# Patient Record
Sex: FEMALE | Race: WHITE | NOT HISPANIC OR LATINO | Employment: UNEMPLOYED | URBAN - METROPOLITAN AREA
[De-identification: names, ages, dates, MRNs, and addresses within clinical notes are randomized per-mention and may not be internally consistent; named-entity substitution may affect disease eponyms.]

---

## 2017-01-07 ENCOUNTER — OFFICE VISIT (OUTPATIENT)
Dept: URGENT CARE | Facility: CLINIC | Age: 22
End: 2017-01-07
Payer: COMMERCIAL

## 2017-01-07 VITALS
SYSTOLIC BLOOD PRESSURE: 98 MMHG | HEART RATE: 60 BPM | TEMPERATURE: 98.4 F | OXYGEN SATURATION: 99 % | RESPIRATION RATE: 16 BRPM | WEIGHT: 106 LBS | DIASTOLIC BLOOD PRESSURE: 64 MMHG | BODY MASS INDEX: 16.85 KG/M2

## 2017-01-07 DIAGNOSIS — K59.09 OTHER CONSTIPATION: ICD-10-CM

## 2017-01-07 DIAGNOSIS — J03.90 TONSILLITIS: ICD-10-CM

## 2017-01-07 LAB
INT CON NEG: NEGATIVE
INT CON POS: POSITIVE
S PYO AG THROAT QL: NEGATIVE

## 2017-01-07 PROCEDURE — 99214 OFFICE O/P EST MOD 30 MIN: CPT | Performed by: PHYSICIAN ASSISTANT

## 2017-01-07 PROCEDURE — 87880 STREP A ASSAY W/OPTIC: CPT | Performed by: PHYSICIAN ASSISTANT

## 2017-01-07 RX ORDER — CEFUROXIME AXETIL 500 MG/1
500 TABLET ORAL 2 TIMES DAILY
Qty: 14 TAB | Refills: 0 | Status: SHIPPED | OUTPATIENT
Start: 2017-01-07 | End: 2017-01-14

## 2017-01-07 ASSESSMENT — ENCOUNTER SYMPTOMS
EYES NEGATIVE: 1
CARDIOVASCULAR NEGATIVE: 1
SORE THROAT: 1
COUGH: 0
CONSTIPATION: 1
RESPIRATORY NEGATIVE: 1
TROUBLE SWALLOWING: 1
CONSTITUTIONAL NEGATIVE: 1
SWOLLEN GLANDS: 1

## 2017-01-07 NOTE — PROGRESS NOTES
Subjective:      Doug Arora is a 21 y.o. female who presents with Constipation and Pharyngitis            Pharyngitis   This is a new (st; constip) problem. The current episode started yesterday. The problem has been unchanged. Neither side of throat is experiencing more pain than the other. There has been no fever. The pain is moderate. Associated symptoms include swollen glands and trouble swallowing. Pertinent negatives include no coughing. She has had no exposure to strep or mono. She has tried nothing for the symptoms. The treatment provided no relief.       Review of Systems   Constitutional: Negative.    HENT: Positive for sore throat and trouble swallowing.    Eyes: Negative.    Respiratory: Negative.  Negative for cough.    Cardiovascular: Negative.    Gastrointestinal: Positive for constipation.   Skin: Negative.           Objective:     BP 98/64 mmHg  Pulse 60  Temp(Src) 36.9 °C (98.4 °F)  Resp 16  Wt 48.081 kg (106 lb)  SpO2 99%     Physical Exam   Constitutional: She is oriented to person, place, and time. She appears well-developed and well-nourished. No distress.   HENT:   Head: Normocephalic and atraumatic.   Mouth/Throat: No oropharyngeal exudate (+tons redn).   Eyes: EOM are normal. Pupils are equal, round, and reactive to light.   Neck: Normal range of motion. Neck supple.   Abdominal: Soft. Bowel sounds are normal. She exhibits no distension. There is no tenderness.   Neurological: She is alert and oriented to person, place, and time.   Skin: Skin is warm and dry.   Psychiatric: She has a normal mood and affect. Her behavior is normal. Judgment and thought content normal.   Nursing note and vitals reviewed.    Filed Vitals:    01/07/17 1223   BP: 98/64   Pulse: 60   Temp: 36.9 °C (98.4 °F)   Resp: 16   Weight: 48.081 kg (106 lb)   SpO2: 99%     Active Ambulatory Problems     Diagnosis Date Noted   • No Active Ambulatory Problems     Resolved Ambulatory Problems     Diagnosis Date  Noted   • No Resolved Ambulatory Problems     No Additional Past Medical History     Current Outpatient Prescriptions on File Prior to Visit   Medication Sig Dispense Refill   • triamcinolone acetonide (KENALOG) 0.1 % Cream AAA twice daily for at least 2 weeks.  Restart with reoccurance of symptoms 60 g 2   • Loratadine (CLARITIN PO) Take  by mouth.     • azithromycin (ZITHROMAX) 250 MG TABS z-ruby; U.D. 6 Tab 1   • propranolol (INDERAL) 10 MG TABS Take 1 Tab by mouth 2 times a day as needed (for heart palpitations). 20 Tab 2   • hydrOXYzine (ATARAX) 25 MG TABS Take 1 Tab by mouth at bedtime as needed for Anxiety (or difficulty sleeping). 30 Tab 2     No current facility-administered medications on file prior to visit.     Gargles, Cepacol lozenges, Aleve/Advil as needed for throat pain  History reviewed. No pertinent family history.  Review of patient's allergies indicates no known allergies.         rst ng     Assessment/Plan:     ·  tonsillitis, constip      · ceftin rx; Mag Cit for constip

## 2017-01-07 NOTE — Clinical Note
January 7, 2017         Patient: Doug Arora   YOB: 1995   Date of Visit: 1/7/2017           To Whom it May Concern:    Doug Arora was seen in my clinic on 1/7/2017 for illness; please excuse today.    If you have any questions or concerns, please don't hesitate to call.        Sincerely,           Wild Lou PA-C  Electronically Signed

## 2017-02-13 ENCOUNTER — OFFICE VISIT (OUTPATIENT)
Dept: MEDICAL GROUP | Facility: MEDICAL CENTER | Age: 22
End: 2017-02-13
Payer: COMMERCIAL

## 2017-02-13 ENCOUNTER — HOSPITAL ENCOUNTER (OUTPATIENT)
Facility: MEDICAL CENTER | Age: 22
End: 2017-02-13
Attending: NURSE PRACTITIONER
Payer: COMMERCIAL

## 2017-02-13 VITALS
TEMPERATURE: 100 F | DIASTOLIC BLOOD PRESSURE: 82 MMHG | BODY MASS INDEX: 16.45 KG/M2 | RESPIRATION RATE: 12 BRPM | SYSTOLIC BLOOD PRESSURE: 102 MMHG | HEART RATE: 110 BPM | OXYGEN SATURATION: 99 % | WEIGHT: 104.8 LBS | HEIGHT: 67 IN

## 2017-02-13 DIAGNOSIS — N92.6 MISSED MENSES: ICD-10-CM

## 2017-02-13 DIAGNOSIS — R63.4 WEIGHT LOSS: ICD-10-CM

## 2017-02-13 DIAGNOSIS — J02.9 ACUTE PHARYNGITIS, UNSPECIFIED ETIOLOGY: ICD-10-CM

## 2017-02-13 DIAGNOSIS — Z11.3 SCREEN FOR STD (SEXUALLY TRANSMITTED DISEASE): ICD-10-CM

## 2017-02-13 DIAGNOSIS — Z30.011 ENCOUNTER FOR INITIAL PRESCRIPTION OF CONTRACEPTIVE PILLS: ICD-10-CM

## 2017-02-13 DIAGNOSIS — I73.00 RAYNAUD'S PHENOMENON WITHOUT GANGRENE: ICD-10-CM

## 2017-02-13 LAB
INT CON NEG: NEGATIVE
INT CON NEG: NEGATIVE
INT CON POS: POSITIVE
INT CON POS: POSITIVE
POC URINE PREGNANCY TEST: NORMAL
S PYO AG THROAT QL: NORMAL

## 2017-02-13 PROCEDURE — 87491 CHLMYD TRACH DNA AMP PROBE: CPT

## 2017-02-13 PROCEDURE — 87591 N.GONORRHOEAE DNA AMP PROB: CPT

## 2017-02-13 PROCEDURE — 87880 STREP A ASSAY W/OPTIC: CPT | Performed by: NURSE PRACTITIONER

## 2017-02-13 PROCEDURE — 99214 OFFICE O/P EST MOD 30 MIN: CPT | Performed by: NURSE PRACTITIONER

## 2017-02-13 PROCEDURE — 81025 URINE PREGNANCY TEST: CPT | Performed by: NURSE PRACTITIONER

## 2017-02-13 RX ORDER — NORETHINDRONE ACETATE AND ETHINYL ESTRADIOL .02; 1 MG/1; MG/1
1 TABLET ORAL DAILY
Qty: 21 TAB | Refills: 11 | Status: SHIPPED | OUTPATIENT
Start: 2017-02-13 | End: 2018-03-07 | Stop reason: SDUPTHER

## 2017-02-13 RX ORDER — NIFEDIPINE 30 MG
30 TABLET, EXTENDED RELEASE ORAL DAILY
Qty: 30 TAB | Refills: 5 | Status: SHIPPED | OUTPATIENT
Start: 2017-02-13 | End: 2018-01-31

## 2017-02-13 ASSESSMENT — PATIENT HEALTH QUESTIONNAIRE - PHQ9: CLINICAL INTERPRETATION OF PHQ2 SCORE: 3

## 2017-02-13 NOTE — MR AVS SNAPSHOT
"        Doug Arora   2017 11:20 AM   Office Visit   MRN: [id number]    Department:  [address]   Dept Phone:  [phone]    Description:  Female : 1995   Provider:  EUGENIA Perez           Reason for Visit     Eczema patient states she is having issues with dry red skin mostly on her hands and some on her face    Contraception patient would like to discuss options for contraception     Nasal Congestion patient states she started with a sore throat but now has mostly nasal congestion & fever onset 2 days       Allergies as of 2017     No Known Allergies      You were diagnosed with     Weight loss   [831923]       Raynaud's phenomenon without gangrene   [9218257]       Acute pharyngitis, unspecified etiology   [8238834]       Missed menses   [125446]       Screen for STD (sexually transmitted disease)   [211094]       Encounter for initial prescription of contraceptive pills   [073682]         Vital Signs     Blood Pressure Pulse Temperature Respirations Height Weight    102/82 mmHg 110 37.8 °C (100 °F) 12 1.689 m (5' 6.5\") 47.537 kg (104 lb 12.8 oz)    Body Mass Index Oxygen Saturation Smoking Status             16.66 kg/m2 99% Never Smoker          Basic Information     Date Of Birth Sex Race Ethnicity Preferred Language    1995 Female White Non- English      Health Maintenance        Date Due Completion Dates    IMM HEP B VACCINE (1 of 3 - Primary Series) 1997 ---    IMM HEP A VACCINE (1 of 2 - Standard Series) 1998 ---    IMM HPV VACCINE (1 of 3 - Female 3 Dose Series) 2008 ---    IMM VARICELLA (CHICKENPOX) VACCINE (1 of 2 - 2 Dose Adolescent Series) 2010 ---    IMM MENINGOCOCCAL VACCINE (MCV4) (1 of 1) 2013 ---    IMM INFLUENZA (1) 2016 ---    IMM DTaP/Tdap/Td Vaccine (1 - Tdap) 2016 ---            Results     POCT Rapid Strep A      Component    Rapid Strep Screen    neg    Internal Control Positive   " Positive    Internal Control Negative    Negative                POCT Pregnancy      Component Value Standard Range & Units    POC Urine Pregnancy Test neg Negative    Internal Control Positive Positive     Internal Control Negative Negative                         Current Immunizations     No immunizations on file.      Below and/or attached are the medications your provider expects you to take. Review all of your home medications and newly ordered medications with your provider and/or pharmacist. Follow medication instructions as directed by your provider and/or pharmacist. Please keep your medication list with you and share with your provider. Update the information when medications are discontinued, doses are changed, or new medications (including over-the-counter products) are added; and carry medication information at all times in the event of emergency situations     Allergies:  No Known Allergies          Medications  Valid as of: February 13, 2017 -  1:03 PM    Generic Name Brand Name Tablet Size Instructions for use    Azithromycin (Tab) ZITHROMAX 250 MG z-ruby; U.D.        HydrOXYzine HCl (Tab) ATARAX 25 MG Take 1 Tab by mouth at bedtime as needed for Anxiety (or difficulty sleeping).        Loratadine   Take  by mouth.        NIFEdipine (TABLET SR 24 HR) ADALAT CC 30 MG Take 1 Tab by mouth every day. In the evening        Norethindrone Acet-Ethinyl Est (Tab) MICROGESTIN 1/20 1-20 MG-MCG Take 1 Tab by mouth every day.        Propranolol HCl (Tab) INDERAL 10 MG Take 1 Tab by mouth 2 times a day as needed (for heart palpitations).        Triamcinolone Acetonide (Cream) KENALOG 0.1 % AAA twice daily for at least 2 weeks.  Restart with reoccurance of symptoms        .                 Medicines prescribed today were sent to:     [id number] - [place] - [address]    [address] [place] [address]    Phone: [phone] Fax: [phone]    Open 24 Hours?: No    [facility] - [place] -  [address]    [address] [place] [address]    Phone: [phone] Fax: [phone]    Open 24 Hours?: No      Medication refill instructions:       If your prescription bottle indicates you have medication refills left, it is not necessary to call your provider’s office. Please contact your pharmacy and they will refill your medication.    If your prescription bottle indicates you do not have any refills left, you may request refills at any time through one of the following ways: The online [facility]oma system (except Urgent Care), by calling your provider’s office, or by asking your pharmacy to contact your provider’s office with a refill request. Medication refills are processed only during regular business hours and may not be available until the next business day. Your provider may request additional information or to have a follow-up visit with you prior to refilling your medication.   *Please Note: Medication refills are assigned a new Rx number when refilled electronically. Your pharmacy may indicate that no refills were authorized even though a new prescription for the same medication is available at the pharmacy. Please request the medicine by name with the pharmacy before contacting your provider for a refill.        Your To Do List     Future Labs/Procedures Complete By Expires    CHLAMYDIA/GC PCR URINE OR SWAB  As directed 2/13/2018    HEP C VIRUS ANTIBODY  As directed 2/13/2018    HIV ANTIBODIES  As directed 2/13/2018    T.PALLIDUM AB EIA  As directed 2/13/2018      Referral     A referral request has been sent to our patient care coordination department. Please allow 3-5 business days for us to process this request and contact you either by phone or mail. If you do not hear from us by the 5th business day, please call us at [phone].           [facility] Access Code: Activation code not generated  Current [facility]oma Status: Active

## 2017-02-13 NOTE — PROGRESS NOTES
Chief Complaint   Patient presents with   • Eczema     patient states she is having issues with dry red skin mostly on her hands and some on her face   • Contraception     patient would like to discuss options for contraception    • Nasal Congestion     patient states she started with a sore throat but now has mostly nasal congestion & fever onset 2 days        HPI  Doug is a 21-year-old established female here with several issues:  #1-contraception: She is requesting a prescription for birth control pills. She is sexually active with one partner, her boyfriend, for the past year. Denies any other sexual partners. Her boyfriend has had other sexual partners. Her boyfriend is in the  and currently gone until July. She would like to start a birth control pill now to get stabilized on it. She's never had a blood clot and does not smoke. LMP was 12/25/2016, skips menses about 1-2 x per year since starting her menses.  Started menses around age 14 and menses were irregular for 2 years.   Last sexual activity was end of Jan.   #2-URI symptoms: New-onset sore throat, congestion and not feeling well for the past 3-4 days. Both parents have been sick at home but are starting to feel better on their own. Mucus has been clear. She is not taking anything for her symptoms. Denies shortness of breath, wheezing, nausea, vomiting or diarrhea.  #3-complains of redness, dry skin and pain to her fingers on all hands for the past several years. Her fingers go white when she is really cold and then it's painful in the skin turns flesh colored again. She has tried steroid creams for eczema and cocoa butter for dry skin without improvement. She continuously wears gloves when she can.  #4-weight loss: requesting referral to see a nutritionist in order to gain weight.  Patient reports that she's always had a very fast metabolism and it's difficult for her to maintain her weight. She reports that she has lost about 10 pounds in  "the past year. She reports that it's difficult for her to eat frequently because of work and her work schedule. She denies abdominal pain, nausea, vomiting, diarrhea, fevers, chills.      Past medical, surgical, family, and social history is reviewed and updated in Epic chart by me today.   Medications and allergies reviewed and updated in Epic chart by me today.     ROS:   As documented in history of present illness above    Exam:  Blood pressure 102/82, pulse 110, temperature 37.8 °C (100 °F), resp. rate 12, height 1.689 m (5' 6.5\"), weight 47.537 kg (104 lb 12.8 oz), SpO2 99 %.  Constitutional: Alert, no distress, plus 3 vital signs  Skin:  Warm, dry, no rashes invisible areas  Eye: Equal, round and reactive, conjunctiva clear  ENMT: Lips without lesions, good dentition, oropharynx clear    Neck: Trachea midline, no masses, no thyromegaly  Respiratory: Unlabored respiration, lungs clear to auscultation, no wheezes, no rhonchi  Cardiovascular: Normal rate and rhythm, no murmur, no edema. Erythema to all fingers and they are fairly cold to touch.  Cap refill is <3 seconds to all fingers.   Abdomen: Soft, nontender, no masses   Psych: Alert, pleasant, well-groomed, normal affect    A/P:  1. Weight loss  -She would like to see a nutritionist which I agree with. We did have a good discussion about calorie dense, health food choices.  - REFERRAL TO NUTRITION SERVICES    2. Raynaud's phenomenon without gangrene  -Trial of nifedipine 30 mg every evening at a low dosage to assess tolerance. Discussed side effects such as hypotension. Discussed lifestyle modifications. Follow up 4 weeks.  - NIFEdipine (ADALAT CC) 30 MG CR tablet; Take 1 Tab by mouth every day. In the evening  Dispense: 30 Tab; Refill: 5    3. Acute pharyngitis, unspecified etiology  -Negative. Discussed viral nature and symptomatic treatment. Follow-up by the end of the week if not resolved.  - POCT Rapid Strep A    4. Missed menses  -Negative. Likely " stress related to her boyfriend leaving and her busy work schedule.  - POCT Pregnancy    5. Screen for STD (sexually transmitted disease)  -Recommend yearly STD screening. Recommend Pap smears starting at age 21. Encouraged condom use at all times when sexually active.  - CHLAMYDIA/GC PCR URINE OR SWAB; Future    6. Encounter for initial prescription of contraceptive pills  -Discussed various methods of contraception with her and she like to try a pill initially. Discussed how to take a birth control pill with her including what to do with missing pills.Pt counseled on birth control pills. Risks, benefits and complications from hormone use reviewed. Failure rates discussed. Back up contraception and STD prevention discussed.   - norethindrone-ethinyl estradiol (LOESTRIN 1/20, 21,) 1-20 MG-MCG per tablet; Take 1 Tab by mouth every day.  Dispense: 21 Tab; Refill: 11  - HIV ANTIBODIES; Future  - T.PALLIDUM AB EIA; Future  - HEP C VIRUS ANTIBODY; Future

## 2017-02-14 LAB
C TRACH DNA GENITAL QL NAA+PROBE: NEGATIVE
N GONORRHOEA DNA GENITAL QL NAA+PROBE: NEGATIVE
SPECIMEN SOURCE: NORMAL

## 2017-02-18 ENCOUNTER — OFFICE VISIT (OUTPATIENT)
Dept: URGENT CARE | Facility: CLINIC | Age: 22
End: 2017-02-18
Payer: COMMERCIAL

## 2017-02-18 VITALS
TEMPERATURE: 99.9 F | OXYGEN SATURATION: 96 % | HEART RATE: 96 BPM | SYSTOLIC BLOOD PRESSURE: 108 MMHG | BODY MASS INDEX: 17.17 KG/M2 | RESPIRATION RATE: 16 BRPM | WEIGHT: 108 LBS | DIASTOLIC BLOOD PRESSURE: 64 MMHG

## 2017-02-18 DIAGNOSIS — R60.9 SWELLING: ICD-10-CM

## 2017-02-18 DIAGNOSIS — T50.905A MEDICATION SIDE EFFECT, INITIAL ENCOUNTER: ICD-10-CM

## 2017-02-18 DIAGNOSIS — R23.2 FLUSHING: ICD-10-CM

## 2017-02-18 PROCEDURE — 99213 OFFICE O/P EST LOW 20 MIN: CPT | Performed by: PHYSICIAN ASSISTANT

## 2017-02-18 RX ORDER — METHYLPREDNISOLONE 4 MG/1
TABLET ORAL
Qty: 1 TAB | Refills: 0 | Status: SHIPPED | OUTPATIENT
Start: 2017-02-18 | End: 2018-01-31

## 2017-02-18 RX ORDER — CEFUROXIME AXETIL 500 MG/1
500 TABLET ORAL 2 TIMES DAILY
Qty: 10 TAB | Refills: 0 | Status: SHIPPED | OUTPATIENT
Start: 2017-02-18 | End: 2017-02-23

## 2017-02-18 ASSESSMENT — ENCOUNTER SYMPTOMS
FEVER: 1
SWOLLEN GLANDS: 0
RESPIRATORY NEGATIVE: 1
SORE THROAT: 0
NEUROLOGICAL NEGATIVE: 1
NUMBNESS: 0
MUSCULOSKELETAL NEGATIVE: 1
WEAKNESS: 0

## 2017-02-18 NOTE — PROGRESS NOTES
Subjective:      Doug Arora is a 21 y.o. female who presents with Shortness of Breath            Other  This is a new problem. The current episode started in the past 7 days (3d on nifedipine, having red flushing body, some foot swelling, little sob; also some fever (st/fever earlier this wk, but strep was neg)). The problem occurs constantly. The problem has been unchanged. Associated symptoms include a fever. Pertinent negatives include no numbness, sore throat, swollen glands or weakness. Nothing aggravates the symptoms. She has tried nothing for the symptoms. The treatment provided no relief.       Review of Systems   Constitutional: Positive for fever.   HENT: Negative for sore throat.    Respiratory: Negative.    Musculoskeletal: Negative.    Skin: Negative.    Neurological: Negative.  Negative for weakness and numbness.          Objective:     /64 mmHg  Pulse 96  Temp(Src) 37.7 °C (99.9 °F)  Resp 16  Wt 48.988 kg (108 lb)  SpO2 96%     Physical Exam   Constitutional: She is oriented to person, place, and time. She appears well-developed and well-nourished. No distress.   Abdominal: She exhibits no distension. There is no tenderness.   Musculoskeletal: Normal range of motion. She exhibits edema. She exhibits no tenderness (r foot /ank;le, no acute swell; +redness feet, hands).   Neurological: She is alert and oriented to person, place, and time.   Skin: Skin is warm and dry. No rash noted. There is erythema.   Psychiatric: She has a normal mood and affect. Her behavior is normal. Judgment and thought content normal.   Nursing note and vitals reviewed.              Assessment/Plan:     1. Flushing/erythroderma       2. Swelling       3. Medication side effect, initial encounter     · D/c nifedipine; start medrol; abx ; lidex cr

## 2017-02-18 NOTE — MR AVS SNAPSHOT
"        Doug Arora   2017 2:00 PM   Office Visit   MRN: [id number]    Department:  [facility]   Dept Phone:  [phone]    Description:  Female : 1995   Provider:  Wild Lou PA-C           Reason for Visit     Shortness of Breath x 2 days, shortness of breath.  X today swelling in both ankles \"Currently taking Nifedipine and loestrin, not sure if is related\"      Allergies as of 2017     No Known Allergies      You were diagnosed with     Flushing   [782.62.ICD-9-CM]       Swelling   [235366]       Medication side effect, initial encounter   [561873]         Vital Signs     Blood Pressure Pulse Temperature Respirations Weight Oxygen Saturation    108/64 mmHg 96 37.7 °C (99.9 °F) 16 48.988 kg (108 lb) 96%    Smoking Status                   Never Smoker            Basic Information     Date Of Birth Sex Race Ethnicity Preferred Language    1995 Female White Non- English      Your appointments     Mar 14, 2017 10:30 AM   New Patient with Tricia Villar RD   [place])    [address]  [address]  [facility] [address]   [phone]           It is the patient's responsibility to check with your Insurance for benefit coverage for visit / visits.  24 hours notice is required for all appointment changes or cancellation.  Please arrive 20 min. before your appointment time  Please bring the following with you: 1)Picture Id 2) Insurance card 3) Completed Forms if New Patient  If scheduled for DIABETES VISIT please also brin) Medications 2) Meter 3) Blood glucose logs 4) Any recent labs if you have them  If scheduled for NUTRITION VISIT please also brin) 2-3 days of detailed food intake logs 2) Blood glucose monitor and blood glucose logs (if you have them)              Health Maintenance        Date Due Completion Dates    IMM HEP B VACCINE (1 of 3 - Primary Series) 1997 ---    IMM HEP A VACCINE (1 of 2 - Standard " Series) 12/14/1998 ---    IMM HPV VACCINE (1 of 3 - Female 3 Dose Series) 12/14/2008 ---    IMM VARICELLA (CHICKENPOX) VACCINE (1 of 2 - 2 Dose Adolescent Series) 12/14/2010 ---    IMM MENINGOCOCCAL VACCINE (MCV4) (1 of 1) 12/14/2013 ---    IMM INFLUENZA (1) 9/1/2016 ---    IMM DTaP/Tdap/Td Vaccine (1 - Tdap) 12/14/2016 ---            Current Immunizations     No immunizations on file.      Below and/or attached are the medications your provider expects you to take. Review all of your home medications and newly ordered medications with your provider and/or pharmacist. Follow medication instructions as directed by your provider and/or pharmacist. Please keep your medication list with you and share with your provider. Update the information when medications are discontinued, doses are changed, or new medications (including over-the-counter products) are added; and carry medication information at all times in the event of emergency situations     Allergies:  No Known Allergies          Medications  Valid as of: February 18, 2017 -  2:46 PM    Generic Name Brand Name Tablet Size Instructions for use    Azithromycin (Tab) ZITHROMAX 250 MG z-ruby; U.D.        Cefuroxime Axetil (Tab) CEFTIN 500 MG Take 1 Tab by mouth 2 times a day for 5 days.        Fluocinonide (Cream) LIDEX 0.05 % Apply 1 Application to affected area(s) 2 times a day.        HydrOXYzine HCl (Tab) ATARAX 25 MG Take 1 Tab by mouth at bedtime as needed for Anxiety (or difficulty sleeping).        Loratadine   Take  by mouth.        MethylPREDNISolone (Tablet Therapy Pack) MEDROL DOSEPAK 4 MG U.D.        NIFEdipine (TABLET SR 24 HR) ADALAT CC 30 MG Take 1 Tab by mouth every day. In the evening        Norethindrone Acet-Ethinyl Est (Tab) MICROGESTIN 1/20 1-20 MG-MCG Take 1 Tab by mouth every day.        Propranolol HCl (Tab) INDERAL 10 MG Take 1 Tab by mouth 2 times a day as needed (for heart palpitations).        Triamcinolone Acetonide (Cream) KENALOG 0.1 %  AAA twice daily for at least 2 weeks.  Restart with reoccurance of symptoms        .                 Medicines prescribed today were sent to:     [id number] - [place], [place] - [address]    [address] [place] [address]    Phone: [phone] Fax: [phone]    Open 24 Hours?: No    [facility] - [place] (), [place] - [address]    [address] [place] [address] [place] [address]    Phone: [phone] Fax: [phone]    Open 24 Hours?: No      Medication refill instructions:       If your prescription bottle indicates you have medication refills left, it is not necessary to call your provider’s office. Please contact your pharmacy and they will refill your medication.    If your prescription bottle indicates you do not have any refills left, you may request refills at any time through one of the following ways: The online [facility] system (except Urgent Care), by calling your provider’s office, or by asking your pharmacy to contact your provider’s office with a refill request. Medication refills are processed only during regular business hours and may not be available until the next business day. Your provider may request additional information or to have a follow-up visit with you prior to refilling your medication.   *Please Note: Medication refills are assigned a new Rx number when refilled electronically. Your pharmacy may indicate that no refills were authorized even though a new prescription for the same medication is available at the pharmacy. Please request the medicine by name with the pharmacy before contacting your provider for a refill.           [facility] Access Code: Activation code not generated  Current [facility] Status: Active

## 2017-03-14 ENCOUNTER — NON-PROVIDER VISIT (OUTPATIENT)
Dept: HEALTH INFORMATION MANAGEMENT | Facility: MEDICAL CENTER | Age: 22
End: 2017-03-14
Payer: COMMERCIAL

## 2017-03-14 VITALS — BODY MASS INDEX: 16.86 KG/M2 | HEIGHT: 67 IN | WEIGHT: 107.4 LBS

## 2017-03-14 DIAGNOSIS — R63.4 WEIGHT LOSS: ICD-10-CM

## 2017-03-14 PROCEDURE — 97802 MEDICAL NUTRITION INDIV IN: CPT | Performed by: DIETITIAN, REGISTERED

## 2017-03-14 NOTE — PROGRESS NOTES
"3/14/2017 21 y.o.   Referring Provider: EUGENIA Perez       Time in/out:  10:37-11:33am     Anthropometrics/Objective  Filed Vitals:    03/14/17 1553   Height: 1.689 m (5' 6.5\")   Weight: 48.716 kg (107 lb 6.4 oz)       Body mass index is 17.08 kg/(m^2).    Stated Goal Weight:  120-130lb   Weight hx: Pt reports weight loss starting 4 years ago when she got a really bad virus. Lost 20lb. Gained it back but then recently has lost more weight. She reports weight loss when she is sick with the flu or cold because she is not hungry. And then has a difficult time gaining it back.   Estimated Daily Caloric needs At least 1700  Kcal Based on 35kcal/kg   See comprehensive patient history form for further information     Subjective:    Pt eats a vegetarian diet. Does not take B complex or any vitamins.   Denies N/V/D/C   States she has always had a fast metabolism   Is a college student and admits she does have a lot of stress in her life.   Drinks water, juice and coffee.   Eats 3 meals. 1 snack. Skip meals some times. Doesn't remember or have time to snack.   Works 2:30-7:30pm and only has a 15min break to eat. Work 3 days a week   B- 8:30am fruit   L- 12 noon veggie sandwich or  Bean & cheese burrito   S- 4-5pm chips or crackers  D- tacos, or pasta, or potatoes with meat substitute   S- Coffee to do homework   See Patient History Form- Nutrition under Media for complete diet hx and patient information.    Nutrition Diagnosis (PES Statement)  Underweight & Involuntary weight loss related to inadequate energy intake as evidenced by weight hx, BMI <18.5     Client history:  Condition(s) associated with a diagnosis or treatment (specify) NA     Biochemical data, medical test and procedures  No results found for: HBA1C@  No results found for: POCGLUCOSE  No results found for: CHOLSTRLTOT, LDL, HDL, TRIGLYCERIDE      Nutrition Intervention  Nutrition Prescription  Recommended Daily Kcals  1700kcal (300kcal breakfast; " 500kcal lunch and dinner; 200kcal snacks x2    Meal and Snack  Recommend a general/healthful diet ; high calorie     Comprehensive Nutrition education Instruction or training leading to in-depth nutrition related knowledge about:  Theraputic diet for weight gain. Discussed tips for adding more calories to her current meals. Encouraged adding 1-2 snacks and ideas for that. Suggested meal replacement drinks. Reviewed high calorie foods to incorporate on a daily basis.     Monitoring & Evaluation Plan    Behavioral-Environmental:  Behavior : keep a food journal for the next 2 weeks to track calorie intake; consider myfitnesspal     Food / Nutrient Intake:  Fluid/Beverage intake : Choose calorie containing beverages like juice and higher fat milks. Try a meal replacement or protein drink supplement for meal or snack.   Food intake: Add high calorie foods like peanut butter, avocado, cheese, sour cream, full fat dairy, coconut oil to your foods. Eat 5 times a day : 3 meals and 1-2 snacks. Eating more often is difficult for pt with her schedule.    Discussed adding more calories at breakfast (peanut butter and banana sandwich), having a larger snack at work (keeping things there to make is easier), and using meal replacement drinks like Ensure or Boost to add calories for weight gain.   Micronutrient intake: Star taking a B-complex and multivitamin supplements. Consider starting a probiotic for immune and GI health.     Physical Signs / Symptoms:  Weight change : weight gain of 1-2 lb per week to goal of 130lb.     Assessment Notes:   Doug has lost 3lb in the past few months, and almost 15lb over the past 3 years. She is underweight based on her BMI. And is only 81% of her ideal body weight. She does not eat many high calorie foods. Being vegetarian , pt is eating mostly fruits and veggies which are low calorie. Suggested ways to increase calorie intake. And also pt needs to ensure she eats enough protein. Also  recommended she take a multivitamin and Bcomplex as she may be deficient in micronutrients given her diet hx. Recommend checking for anemia (iron and B12 and folate levels. And/or start empirically supplementing). Pt will track her food and beverage intake in an online food journal for me to review in 2 weeks. She is mostly concerned about maintaining the weight once she gains it back. Which we can address as well.     Follow up 2 week s  Tricia Hope, CRISTIN, LD, CDE  [phone]

## 2017-03-14 NOTE — MR AVS SNAPSHOT
Doug Arora   3/14/2017 10:30 AM   Appointment   MRN: [id number]    Department:  [facility]   Dept Phone:  [phone]    Description:  Female : 1995   Provider:  Tricia Villar RD           Allergies as of 3/14/2017     No Known Allergies      Vital Signs     Smoking Status                   Never Smoker            Basic Information     Date Of Birth Sex Race Ethnicity Preferred Language    1995 Female White Non- English      Your appointments     Mar 28, 2017 10:00 AM   Follow Up Visit with Tricia Villar RD   [facility])    [address]  [address]  [place] [id number] [phone]           It is the patient's responsibility to check with your Insurance for benefit coverage for visit / visits.  24 hours notice is required for all appointment changes or cancellation.  Please arrive 20 min. before your appointment time  Please bring the following with you: 1)Picture Id 2) Insurance card 3) Completed Forms if New Patient  If scheduled for DIABETES VISIT please also brin) Medications 2) Meter 3) Blood glucose logs 4) Any recent labs if you have them  If scheduled for NUTRITION VISIT please also brin) 2-3 days of detailed food intake logs 2) Blood glucose monitor and blood glucose logs (if you have them)              Health Maintenance        Date Due Completion Dates    IMM HEP B VACCINE (1 of 3 - Primary Series) 1997 ---    IMM HEP A VACCINE (1 of 2 - Standard Series) 1998 ---    IMM HPV VACCINE (1 of 3 - Female 3 Dose Series) 2008 ---    IMM VARICELLA (CHICKENPOX) VACCINE (1 of 2 - 2 Dose Adolescent Series) 2010 ---    IMM MENINGOCOCCAL VACCINE (MCV4) (1 of 1) 2013 ---    IMM INFLUENZA (1) 2016 ---    IMM DTaP/Tdap/Td Vaccine (1 - Tdap) 2016 ---            Current Immunizations     No immunizations on file.      Below and/or attached are the medications your provider expects you to  take. Review all of your home medications and newly ordered medications with your provider and/or pharmacist. Follow medication instructions as directed by your provider and/or pharmacist. Please keep your medication list with you and share with your provider. Update the information when medications are discontinued, doses are changed, or new medications (including over-the-counter products) are added; and carry medication information at all times in the event of emergency situations     Allergies:  No Known Allergies          Medications  Valid as of: March 14, 2017 - 11:36 AM    Generic Name Brand Name Tablet Size Instructions for use    Azithromycin (Tab) ZITHROMAX 250 MG z-ruby; U.D.        Fluocinonide (Cream) LIDEX 0.05 % Apply 1 Application to affected area(s) 2 times a day.        HydrOXYzine HCl (Tab) ATARAX 25 MG Take 1 Tab by mouth at bedtime as needed for Anxiety (or difficulty sleeping).        Loratadine   Take  by mouth.        MethylPREDNISolone (Tablet Therapy Pack) MEDROL DOSEPAK 4 MG U.D.        NIFEdipine (TABLET SR 24 HR) ADALAT CC 30 MG Take 1 Tab by mouth every day. In the evening        Norethindrone Acet-Ethinyl Est (Tab) MICROGESTIN 1/20 1-20 MG-MCG Take 1 Tab by mouth every day.        Propranolol HCl (Tab) INDERAL 10 MG Take 1 Tab by mouth 2 times a day as needed (for heart palpitations).        Triamcinolone Acetonide (Cream) KENALOG 0.1 % AAA twice daily for at least 2 weeks.  Restart with reoccurance of symptoms        .                 Medicines prescribed today were sent to:     [facility] - [place], [place] - [address]    [address] [facility] [address]    Phone: [phone] Fax: [phone]    Open 24 Hours?: No    [facility] - [place] (), [place] - [address]    [address] [place] [address] () [place] [address]    Phone: [phone] Fax: [phone]    Open 24 Hours?: No      Medication refill instructions:       If your prescription bottle indicates you have medication  refills left, it is not necessary to call your provider’s office. Please contact your pharmacy and they will refill your medication.    If your prescription bottle indicates you do not have any refills left, you may request refills at any time through one of the following ways: The online [employer] system (except Urgent Care), by calling your provider’s office, or by asking your pharmacy to contact your provider’s office with a refill request. Medication refills are processed only during regular business hours and may not be available until the next business day. Your provider may request additional information or to have a follow-up visit with you prior to refilling your medication.   *Please Note: Medication refills are assigned a new Rx number when refilled electronically. Your pharmacy may indicate that no refills were authorized even though a new prescription for the same medication is available at the pharmacy. Please request the medicine by name with the pharmacy before contacting your provider for a refill.           [employer] Access Code: Activation code not generated  Current [employer] Status: Active

## 2017-03-28 ENCOUNTER — NON-PROVIDER VISIT (OUTPATIENT)
Dept: HEALTH INFORMATION MANAGEMENT | Facility: MEDICAL CENTER | Age: 22
End: 2017-03-28
Payer: COMMERCIAL

## 2017-03-28 VITALS — WEIGHT: 110.2 LBS | BODY MASS INDEX: 17.3 KG/M2 | HEIGHT: 67 IN

## 2017-03-28 DIAGNOSIS — R63.4 WEIGHT LOSS: ICD-10-CM

## 2017-03-28 PROCEDURE — 97803 MED NUTRITION INDIV SUBSEQ: CPT | Performed by: DIETITIAN, REGISTERED

## 2017-03-28 NOTE — MR AVS SNAPSHOT
Doug Arora   3/28/2017 10:00 AM   Appointment   MRN: [id number]    Department:  Health [facility]   Dept Phone:  [phone]    Description:  Female : 1995   Provider:  Tricia Villar RD           Allergies as of 3/28/2017     No Known Allergies      Vital Signs     Smoking Status                   Never Smoker            Basic Information     Date Of Birth Sex Race Ethnicity Preferred Language    1995 Female White Non- English      Your appointments     2017  9:00 AM   Follow Up Visit with Tricia Villar RD   [facility])    [address]  [address]  [place] [id number] [phone]           It is the patient's responsibility to check with your Insurance for benefit coverage for visit / visits.  24 hours notice is required for all appointment changes or cancellation.  Please arrive 20 min. before your appointment time  Please bring the following with you: 1)Picture Id 2) Insurance card 3) Completed Forms if New Patient  If scheduled for DIABETES VISIT please also brin) Medications 2) Meter 3) Blood glucose logs 4) Any recent labs if you have them  If scheduled for NUTRITION VISIT please also brin) 2-3 days of detailed food intake logs 2) Blood glucose monitor and blood glucose logs (if you have them)              Health Maintenance        Date Due Completion Dates    IMM HEP B VACCINE (1 of 3 - Primary Series) 1997 ---    IMM HEP A VACCINE (1 of 2 - Standard Series) 1998 ---    IMM HPV VACCINE (1 of 3 - Female 3 Dose Series) 2008 ---    IMM VARICELLA (CHICKENPOX) VACCINE (1 of 2 - 2 Dose Adolescent Series) 2010 ---    IMM MENINGOCOCCAL VACCINE (MCV4) (1 of 1) 2013 ---    IMM INFLUENZA (1) 2016 ---    IMM DTaP/Tdap/Td Vaccine (1 - Tdap) 2016 ---            Current Immunizations     No immunizations on file.      Below and/or attached are the medications your provider expects you to  take. Review all of your home medications and newly ordered medications with your provider and/or pharmacist. Follow medication instructions as directed by your provider and/or pharmacist. Please keep your medication list with you and share with your provider. Update the information when medications are discontinued, doses are changed, or new medications (including over-the-counter products) are added; and carry medication information at all times in the event of emergency situations     Allergies:  No Known Allergies          Medications  Valid as of: March 28, 2017 - 10:53 AM    Generic Name Brand Name Tablet Size Instructions for use    Azithromycin (Tab) ZITHROMAX 250 MG z-ruby; U.D.        Fluocinonide (Cream) LIDEX 0.05 % Apply 1 Application to affected area(s) 2 times a day.        HydrOXYzine HCl (Tab) ATARAX 25 MG Take 1 Tab by mouth at bedtime as needed for Anxiety (or difficulty sleeping).        Loratadine   Take  by mouth.        MethylPREDNISolone (Tablet Therapy Pack) MEDROL DOSEPAK 4 MG U.D.        NIFEdipine (TABLET SR 24 HR) ADALAT CC 30 MG Take 1 Tab by mouth every day. In the evening        Norethindrone Acet-Ethinyl Est (Tab) MICROGESTIN 1/20 1-20 MG-MCG Take 1 Tab by mouth every day.        Propranolol HCl (Tab) INDERAL 10 MG Take 1 Tab by mouth 2 times a day as needed (for heart palpitations).        Triamcinolone Acetonide (Cream) KENALOG 0.1 % AAA twice daily for at least 2 weeks.  Restart with reoccurance of symptoms        .                 Medicines prescribed today were sent to:     [facility] - [place], [place] - [address]    [address] [facility] [address]    Phone: [phone] Fax: [phone]    Open 24 Hours?: No    [facility] - [place] (), [place] - [address]    [address] [place] [address] () [place] [address]    Phone: [phone] Fax: [phone]    Open 24 Hours?: No      Medication refill instructions:       If your prescription bottle indicates you have medication  refills left, it is not necessary to call your provider’s office. Please contact your pharmacy and they will refill your medication.    If your prescription bottle indicates you do not have any refills left, you may request refills at any time through one of the following ways: The online [employer] system (except Urgent Care), by calling your provider’s office, or by asking your pharmacy to contact your provider’s office with a refill request. Medication refills are processed only during regular business hours and may not be available until the next business day. Your provider may request additional information or to have a follow-up visit with you prior to refilling your medication.   *Please Note: Medication refills are assigned a new Rx number when refilled electronically. Your pharmacy may indicate that no refills were authorized even though a new prescription for the same medication is available at the pharmacy. Please request the medicine by name with the pharmacy before contacting your provider for a refill.           [employer] Access Code: Activation code not generated  Current [employer] Status: Active

## 2017-04-25 ENCOUNTER — NON-PROVIDER VISIT (OUTPATIENT)
Dept: HEALTH INFORMATION MANAGEMENT | Facility: MEDICAL CENTER | Age: 22
End: 2017-04-25
Payer: COMMERCIAL

## 2017-04-25 VITALS — WEIGHT: 112.2 LBS | HEIGHT: 67 IN | BODY MASS INDEX: 17.61 KG/M2

## 2017-04-25 DIAGNOSIS — R63.4 WEIGHT LOSS: ICD-10-CM

## 2017-04-25 PROCEDURE — 97803 MED NUTRITION INDIV SUBSEQ: CPT | Performed by: DIETITIAN, REGISTERED

## 2017-04-25 NOTE — PROGRESS NOTES
"Nutrition Reassess    4/25/2017    EUGENIA Perez   21 y.o.   Time: in/out 08:50-09:05am       Subjective:  Pt states she is pleased with her weight gain. She is feeling that she has more energy as well. Continues to use my fitness pal food journal to track calories and protein. Has increased her protein intake; is getting about 15% of kcal on average. She is consuming between 1800-2000kcal on average. She went on a trip last week and stated it was difficult to eat as frequently. Doesn't have any more trips coming up so she is not worried about it.   She takes her multivitamin 3 out of 7 days a week   Is consuming protein at every meal and snack -- beans, nuts/nutbutter, protein bars, cheese, etc . Tried a protein drink but didn't like the taste.     Anthropometrics/Objective    Filed Vitals:    04/25/17 0902   Height: 1.689 m (5' 6.5\")   Weight: 50.894 kg (112 lb 3.2 oz)     Body mass index is 17.84 kg/(m^2).       Weight hx:   107.4lb 3/14/17   110.2lb 3/28/17   112.2lb today 4/25/17   Weight change: +4.8lb in 6 weeks (+0.8lb per week average)     Diet Recall  Time   :B  PB banana sandwich    :S  None    :L  Large port of subs veggie sandwich with avocado and cheese. Or been/cheese burrito.   :S  Larabar    :D  Tacos or pasta . Includes protein or meat substitute    :S  Granola bar or yogurt or PB     ReAssesment/Notes:  Pt has gained an average of 0.8lb per week. Goal is 1-2lb per week ideally, however this is still an appropriate rate of weight gain. She is feeling better with more energy which is great to hear. She is meeting her protein and calorie needs now. I anticipate she will continue to gain weight if she continues as she is doing now. Pt is now eating breakfast which she was skipping before. And by adding 1-2 snacks she has been able to increase her calorie intake. Tracking has helped her to be more aware, and is going to be a good tool to help her long term. Pt has the tools to know what she " needs to do to continue to eat healthy and gain weight. She states she is very happy and that this has been very helpful for her.   She has my contact information if she needs to reach me in the future.     Follow-up: PRN

## 2017-04-25 NOTE — MR AVS SNAPSHOT
"        Doug Arora   2017 9:00 AM   Non-Provider Visit   MRN: [id number]    Department:  [facility]   Dept Phone:  [phone]    Description:  Female : 1995   Provider:  Tricia Villar RD           Reason for Visit     Weight Loss           Allergies as of 2017     No Known Allergies      You were diagnosed with     Weight loss   [009784]         Vital Signs     Height Weight Body Mass Index Smoking Status          1.689 m (5' 6.5\") 50.894 kg (112 lb 3.2 oz) 17.84 kg/m2 Never Smoker         Basic Information     Date Of Birth Sex Race Ethnicity Preferred Language    1995 Female White Non- English      Health Maintenance        Date Due Completion Dates    IMM HEP B VACCINE (1 of 3 - Primary Series) 1997 ---    IMM HEP A VACCINE (1 of 2 - Standard Series) 1998 ---    IMM HPV VACCINE (1 of 3 - Female 3 Dose Series) 2008 ---    IMM VARICELLA (CHICKENPOX) VACCINE (1 of 2 - 2 Dose Adolescent Series) 2010 ---    IMM MENINGOCOCCAL VACCINE (MCV4) (1 of 1) 2013 ---    IMM DTaP/Tdap/Td Vaccine (1 - Tdap) 2016 ---            Current Immunizations     No immunizations on file.      Below and/or attached are the medications your provider expects you to take. Review all of your home medications and newly ordered medications with your provider and/or pharmacist. Follow medication instructions as directed by your provider and/or pharmacist. Please keep your medication list with you and share with your provider. Update the information when medications are discontinued, doses are changed, or new medications (including over-the-counter products) are added; and carry medication information at all times in the event of emergency situations     Allergies:  No Known Allergies          Medications  Valid as of: 2017 - 12:56 PM    Generic Name Brand Name Tablet Size Instructions for use    Azithromycin (Tab) ZITHROMAX 250 MG z-ruby; U.D.       " Fluocinonide (Cream) LIDEX 0.05 % Apply 1 Application to affected area(s) 2 times a day.        HydrOXYzine HCl (Tab) ATARAX 25 MG Take 1 Tab by mouth at bedtime as needed for Anxiety (or difficulty sleeping).        Loratadine   Take  by mouth.        MethylPREDNISolone (Tablet Therapy Pack) MEDROL DOSEPAK 4 MG U.D.        NIFEdipine (TABLET SR 24 HR) ADALAT CC 30 MG Take 1 Tab by mouth every day. In the evening        Norethindrone Acet-Ethinyl Est (Tab) MICROGESTIN 1/20 1-20 MG-MCG Take 1 Tab by mouth every day.        Propranolol HCl (Tab) INDERAL 10 MG Take 1 Tab by mouth 2 times a day as needed (for heart palpitations).        Triamcinolone Acetonide (Cream) KENALOG 0.1 % AAA twice daily for at least 2 weeks.  Restart with reoccurance of symptoms        .                 Medicines prescribed today were sent to:     [facility] - [place], [place] - [address]    [address] [facility] [address]    Phone: [phone] Fax: [phone]    Open 24 Hours?: No    [facility] - [place] (), [place] - [address]    [address] () [place] [address]    Phone: [phone] Fax: [phone]    Open 24 Hours?: No      Medication refill instructions:       If your prescription bottle indicates you have medication refills left, it is not necessary to call your provider’s office. Please contact your pharmacy and they will refill your medication.    If your prescription bottle indicates you do not have any refills left, you may request refills at any time through one of the following ways: The online "[employer] system (except Urgent Care), by calling your provider’s office, or by asking your pharmacy to contact your provider’s office with a refill request. Medication refills are processed only during regular business hours and may not be available until the next business day. Your provider may request additional information or to have a follow-up visit with you prior to refilling your medication.   *Please Note:  Medication refills are assigned a new Rx number when refilled electronically. Your pharmacy may indicate that no refills were authorized even though a new prescription for the same medication is available at the pharmacy. Please request the medicine by name with the pharmacy before contacting your provider for a refill.           [employer] Access Code: Activation code not generated  Current [employer] Status: Active

## 2017-05-30 ENCOUNTER — OFFICE VISIT (OUTPATIENT)
Dept: MEDICAL GROUP | Facility: LAB | Age: 22
End: 2017-05-30
Payer: COMMERCIAL

## 2017-05-30 VITALS
WEIGHT: 112 LBS | TEMPERATURE: 99.7 F | BODY MASS INDEX: 18 KG/M2 | HEIGHT: 66 IN | DIASTOLIC BLOOD PRESSURE: 74 MMHG | OXYGEN SATURATION: 98 % | HEART RATE: 84 BPM | SYSTOLIC BLOOD PRESSURE: 104 MMHG | RESPIRATION RATE: 12 BRPM

## 2017-05-30 DIAGNOSIS — F41.9 ANXIETY: ICD-10-CM

## 2017-05-30 DIAGNOSIS — G47.9 SLEEP DISTURBANCE: ICD-10-CM

## 2017-05-30 PROCEDURE — 99214 OFFICE O/P EST MOD 30 MIN: CPT | Performed by: NURSE PRACTITIONER

## 2017-05-30 RX ORDER — HYDROXYZINE HYDROCHLORIDE 25 MG/1
25 TABLET, FILM COATED ORAL NIGHTLY PRN
Qty: 30 TAB | Refills: 2 | Status: SHIPPED | OUTPATIENT
Start: 2017-05-30 | End: 2018-01-31

## 2017-05-30 NOTE — PROGRESS NOTES
"Chief Complaint   Patient presents with   • Insomnia     pt states she is having issues with insomnia, onset 2-3 weeks     • Anxiety     pt states she is also having issues with anxiety, ongoing        HPI  21-year-old established female here with complaint of difficulty sleeping and feeling anxious - new issue. Trouble saying asleep, not falling asleep - waking up numerous times in the night.  Symptoms started a little over a month ago and seems to be persistent. She would like to see a therapist and is interested in medication to help her sleep. She does have a history of anxiety and difficulty sleeping a few years ago but this resolved until last month. Denies depression although she does cry fairly frequently. She has been out of school x a few weeks - started having trouble staying asleep a few weeks before that.  wking @ [employer] this summer, states that she does not hate or dread her job but is indifferent about it.  Back to [facility] this fall.  Living at home.  Boyfriend gone in army training in [place].  Feeling worried frequently, no OCD behaviors, crying easily.  Irritable occasionally. Denies any self-harm, suicidal or homicidal ideation.     LMP 1 mo ago - occuring regularly.      Past medical, surgical, family, and social history is reviewed and updated in Epic chart by me today.   Medications and allergies reviewed and updated in Epic chart by me today.     ROS:   Denies chest pain, heart palpitations, diarrhea or excessive hair loss    Exam:  Blood pressure 104/74, pulse 84, temperature 37.6 °C (99.7 °F), resp. rate 12, height 1.689 m (5' 6.5\"), weight 50.803 kg (112 lb), SpO2 98 %.  Constitutional: Alert, no distress, plus 3 vital signs  Skin:  Warm, dry, no rashes invisible areas  Eye: Equal, round and reactive, conjunctiva clear  ENMT: Lips without lesions, good dentition, oropharynx clear    Neck: Trachea midline, no masses, no thyromegaly  Respiratory: Unlabored respiration, lungs clear to auscultation, " no wheezes, no rhonchi  Cardiovascular: Normal rate and rhythm  Psych: Alert, pleasant, well-groomed, normal affect    A/P:  1. Anxiety  -Trial of hydroxyzine 25 mg nightly, allowing at least 8-9 hours for sleep. Discussed avoidance of caffeine within 8 hours of bedtime. Encouraged her to continue with exercise. Discussed sleep hygiene. She'll email me of hydroxyzine is not helpful.  - hydrOXYzine (ATARAX) 25 MG Tab; Take 1 Tab by mouth at bedtime as needed for Anxiety (or difficulty sleeping).  Dispense: 30 Tab; Refill: 2  - REFERRAL TO BEHAVIORAL HEALTH    2. Sleep disturbance  -As above. Referral placed for therapy. Recommend a follow-up here in a few weeks.  - hydrOXYzine (ATARAX) 25 MG Tab; Take 1 Tab by mouth at bedtime as needed for Anxiety (or difficulty sleeping).  Dispense: 30 Tab; Refill: 2  - REFERRAL TO BEHAVIORAL HEALTH

## 2017-06-26 ENCOUNTER — PATIENT MESSAGE (OUTPATIENT)
Dept: MEDICAL GROUP | Facility: LAB | Age: 22
End: 2017-06-26

## 2017-06-26 NOTE — TELEPHONE ENCOUNTER
Hi, I never heard back about the referral to a therapist. I'm not sure why but I was wondering if maybe it didn't go through or something?

## 2017-11-25 ENCOUNTER — OFFICE VISIT (OUTPATIENT)
Dept: URGENT CARE | Facility: CLINIC | Age: 22
End: 2017-11-25
Payer: COMMERCIAL

## 2017-11-25 VITALS
DIASTOLIC BLOOD PRESSURE: 66 MMHG | TEMPERATURE: 98.8 F | HEART RATE: 68 BPM | SYSTOLIC BLOOD PRESSURE: 98 MMHG | BODY MASS INDEX: 17.52 KG/M2 | HEIGHT: 66 IN | OXYGEN SATURATION: 98 % | WEIGHT: 109 LBS

## 2017-11-25 DIAGNOSIS — J02.9 PHARYNGITIS, UNSPECIFIED ETIOLOGY: ICD-10-CM

## 2017-11-25 LAB
HETEROPH AB SER QL LA: NEGATIVE
INT CON NEG: NEGATIVE
INT CON NEG: NEGATIVE
INT CON POS: POSITIVE
INT CON POS: POSITIVE
S PYO AG THROAT QL: NEGATIVE

## 2017-11-25 PROCEDURE — 87880 STREP A ASSAY W/OPTIC: CPT | Performed by: NURSE PRACTITIONER

## 2017-11-25 PROCEDURE — 99214 OFFICE O/P EST MOD 30 MIN: CPT | Performed by: NURSE PRACTITIONER

## 2017-11-25 PROCEDURE — 86308 HETEROPHILE ANTIBODY SCREEN: CPT | Performed by: NURSE PRACTITIONER

## 2017-11-25 RX ORDER — AMOXICILLIN 500 MG/1
500 CAPSULE ORAL 3 TIMES DAILY
Qty: 30 CAP | Refills: 0 | Status: SHIPPED | OUTPATIENT
Start: 2017-11-25 | End: 2017-12-05

## 2017-11-25 ASSESSMENT — ENCOUNTER SYMPTOMS
SORE THROAT: 1
DIARRHEA: 0
SWOLLEN GLANDS: 1
FEVER: 0
RESPIRATORY NEGATIVE: 1
VOMITING: 0
HOARSE VOICE: 0
TROUBLE SWALLOWING: 1
NAUSEA: 0
EYES NEGATIVE: 1
MYALGIAS: 0
CARDIOVASCULAR NEGATIVE: 1
CHILLS: 0

## 2017-11-25 NOTE — PROGRESS NOTES
Subjective:      Doug Arora is a 21 y.o. female who presents with Pharyngitis (X 1 day, nausea, post nasal drip, pain with swallowing )    History reviewed. No pertinent past medical history.    Social History     Social History   • Marital status: Single     Spouse name: N/A   • Number of children: N/A   • Years of education: N/A     Occupational History   • Not on file.     Social History Main Topics   • Smoking status: Never Smoker   • Smokeless tobacco: Never Used   • Alcohol use No   • Drug use: No   • Sexual activity: No     Other Topics Concern   • Not on file     Social History Narrative   • No narrative on file     History reviewed. No pertinent family history.    Allergies: Patient has no known allergies.    Patient is a 21-year-old female who presents today with complaint of sore throat. She has no other upper respiratory symptoms. Denies cough or congestion. States that many of her coworkers have been ill recently and states that she was exposed to strep throat at work through her boss. Current symptoms started over the last 2 days.          Pharyngitis    This is a new problem. The current episode started in the past 7 days. The problem has been rapidly worsening. Neither side of throat is experiencing more pain than the other. There has been no fever. Associated symptoms include congestion, swollen glands and trouble swallowing. Pertinent negatives include no diarrhea, ear pain, hoarse voice or vomiting. She has tried nothing for the symptoms. The treatment provided no relief.       Review of Systems   Constitutional: Positive for malaise/fatigue. Negative for chills and fever.   HENT: Positive for congestion, sore throat and trouble swallowing. Negative for ear pain and hoarse voice.    Eyes: Negative.    Respiratory: Negative.    Cardiovascular: Negative.    Gastrointestinal: Negative for diarrhea, nausea and vomiting.   Musculoskeletal: Negative for myalgias.   All other systems reviewed  and are negative.         Objective:     There were no vitals taken for this visit.     Physical Exam   Constitutional: She is oriented to person, place, and time. She appears well-developed and well-nourished. No distress.   HENT:   Head: Normocephalic.   Right Ear: External ear normal.   Left Ear: External ear normal.   Nose: Nose normal.   Mouth/Throat: Oropharynx is clear and moist. No oropharyngeal exudate.   Eyes: Conjunctivae and EOM are normal. Pupils are equal, round, and reactive to light.   Neck: Normal range of motion. Neck supple.   Cardiovascular: Normal rate and regular rhythm.    Pulmonary/Chest: Effort normal and breath sounds normal.   Neurological: She is alert and oriented to person, place, and time.   Skin: Skin is warm and dry. Capillary refill takes less than 2 seconds. She is not diaphoretic.   Psychiatric: She has a normal mood and affect. Her behavior is normal. Judgment and thought content normal.   Vitals reviewed.    poct strep: negative     poct mono: negative             Assessment/Plan:   Pharyngitis  Exposure to strep throat  -amoxil  -warm saltwater gargles  -tylenol/motrin PRN  -follow up if sytmpoms persist or wrosen  There are no diagnoses linked to this encounter.

## 2018-01-24 ENCOUNTER — TELEPHONE (OUTPATIENT)
Dept: MEDICAL GROUP | Facility: LAB | Age: 23
End: 2018-01-24

## 2018-01-24 DIAGNOSIS — M41.9 SCOLIOSIS, UNSPECIFIED SCOLIOSIS TYPE, UNSPECIFIED SPINAL REGION: Primary | ICD-10-CM

## 2018-01-24 NOTE — TELEPHONE ENCOUNTER
----- Message from Doug Arora sent at 1/23/2018  5:45 PM PST -----  Regarding: Prescription Question  Contact: [phone]  Power Andersen,  As you may know, I have scoliosis and it's been kind of bad lately. I'd like to start physical therapy at [facility] ([facility]) with Angie Price and I was wondering if you could get me a prescription.   Thanks!    Doug

## 2018-01-31 ENCOUNTER — HOSPITAL ENCOUNTER (OUTPATIENT)
Dept: LAB | Facility: MEDICAL CENTER | Age: 23
End: 2018-01-31
Attending: NURSE PRACTITIONER
Payer: COMMERCIAL

## 2018-01-31 ENCOUNTER — OFFICE VISIT (OUTPATIENT)
Dept: MEDICAL GROUP | Facility: LAB | Age: 23
End: 2018-01-31
Payer: COMMERCIAL

## 2018-01-31 VITALS
HEART RATE: 83 BPM | RESPIRATION RATE: 12 BRPM | OXYGEN SATURATION: 96 % | DIASTOLIC BLOOD PRESSURE: 68 MMHG | TEMPERATURE: 99.2 F | SYSTOLIC BLOOD PRESSURE: 94 MMHG | HEIGHT: 66 IN | WEIGHT: 107 LBS | BODY MASS INDEX: 17.19 KG/M2

## 2018-01-31 DIAGNOSIS — F33.1 MODERATE EPISODE OF RECURRENT MAJOR DEPRESSIVE DISORDER (HCC): ICD-10-CM

## 2018-01-31 DIAGNOSIS — R53.83 FATIGUE, UNSPECIFIED TYPE: ICD-10-CM

## 2018-01-31 LAB
ALBUMIN SERPL BCP-MCNC: 3.3 G/DL (ref 3.2–4.9)
ALBUMIN/GLOB SERPL: 0.9 G/DL
ALP SERPL-CCNC: 47 U/L (ref 30–99)
ALT SERPL-CCNC: 10 U/L (ref 2–50)
ANION GAP SERPL CALC-SCNC: 8 MMOL/L (ref 0–11.9)
AST SERPL-CCNC: 13 U/L (ref 12–45)
BASOPHILS # BLD AUTO: 0.4 % (ref 0–1.8)
BASOPHILS # BLD: 0.03 K/UL (ref 0–0.12)
BILIRUB SERPL-MCNC: 1.3 MG/DL (ref 0.1–1.5)
BUN SERPL-MCNC: 8 MG/DL (ref 8–22)
CALCIUM SERPL-MCNC: 9.1 MG/DL (ref 8.5–10.5)
CHLORIDE SERPL-SCNC: 106 MMOL/L (ref 96–112)
CO2 SERPL-SCNC: 23 MMOL/L (ref 20–33)
CREAT SERPL-MCNC: 0.57 MG/DL (ref 0.5–1.4)
EOSINOPHIL # BLD AUTO: 0.02 K/UL (ref 0–0.51)
EOSINOPHIL NFR BLD: 0.2 % (ref 0–6.9)
ERYTHROCYTE [DISTWIDTH] IN BLOOD BY AUTOMATED COUNT: 43.1 FL (ref 35.9–50)
FOLATE SERPL-MCNC: 16.2 NG/ML
GLOBULIN SER CALC-MCNC: 3.8 G/DL (ref 1.9–3.5)
GLUCOSE SERPL-MCNC: 92 MG/DL (ref 65–99)
HCT VFR BLD AUTO: 45.2 % (ref 37–47)
HGB BLD-MCNC: 15.3 G/DL (ref 12–16)
IMM GRANULOCYTES # BLD AUTO: 0.02 K/UL (ref 0–0.11)
IMM GRANULOCYTES NFR BLD AUTO: 0.2 % (ref 0–0.9)
IRON SATN MFR SERPL: 43 % (ref 15–55)
IRON SERPL-MCNC: 147 UG/DL (ref 40–170)
LYMPHOCYTES # BLD AUTO: 2.31 K/UL (ref 1–4.8)
LYMPHOCYTES NFR BLD: 27.4 % (ref 22–41)
MCH RBC QN AUTO: 31.6 PG (ref 27–33)
MCHC RBC AUTO-ENTMCNC: 33.8 G/DL (ref 33.6–35)
MCV RBC AUTO: 93.4 FL (ref 81.4–97.8)
MONOCYTES # BLD AUTO: 0.43 K/UL (ref 0–0.85)
MONOCYTES NFR BLD AUTO: 5.1 % (ref 0–13.4)
NEUTROPHILS # BLD AUTO: 5.61 K/UL (ref 2–7.15)
NEUTROPHILS NFR BLD: 66.7 % (ref 44–72)
NRBC # BLD AUTO: 0 K/UL
NRBC BLD-RTO: 0 /100 WBC
PLATELET # BLD AUTO: 228 K/UL (ref 164–446)
PMV BLD AUTO: 12.2 FL (ref 9–12.9)
POTASSIUM SERPL-SCNC: 4.2 MMOL/L (ref 3.6–5.5)
PROT SERPL-MCNC: 7.1 G/DL (ref 6–8.2)
RBC # BLD AUTO: 4.84 M/UL (ref 4.2–5.4)
SODIUM SERPL-SCNC: 137 MMOL/L (ref 135–145)
T4 FREE SERPL-MCNC: 0.87 NG/DL (ref 0.53–1.43)
TIBC SERPL-MCNC: 344 UG/DL (ref 250–450)
TSH SERPL DL<=0.005 MIU/L-ACNC: 1.98 UIU/ML (ref 0.38–5.33)
VIT B12 SERPL-MCNC: 211 PG/ML (ref 211–911)
WBC # BLD AUTO: 8.4 K/UL (ref 4.8–10.8)

## 2018-01-31 PROCEDURE — 83540 ASSAY OF IRON: CPT

## 2018-01-31 PROCEDURE — 82607 VITAMIN B-12: CPT

## 2018-01-31 PROCEDURE — 36415 COLL VENOUS BLD VENIPUNCTURE: CPT

## 2018-01-31 PROCEDURE — 85025 COMPLETE CBC W/AUTO DIFF WBC: CPT

## 2018-01-31 PROCEDURE — 99214 OFFICE O/P EST MOD 30 MIN: CPT | Performed by: NURSE PRACTITIONER

## 2018-01-31 PROCEDURE — 82746 ASSAY OF FOLIC ACID SERUM: CPT

## 2018-01-31 PROCEDURE — 80053 COMPREHEN METABOLIC PANEL: CPT

## 2018-01-31 PROCEDURE — 84443 ASSAY THYROID STIM HORMONE: CPT

## 2018-01-31 PROCEDURE — 83550 IRON BINDING TEST: CPT

## 2018-01-31 PROCEDURE — 84439 ASSAY OF FREE THYROXINE: CPT

## 2018-01-31 RX ORDER — LEVOMEFOLATE CALCIUM 7.5 MG TABLET
1 TABLET DAILY
Qty: 30 TAB | Refills: 5 | Status: SHIPPED | OUTPATIENT
Start: 2018-01-31 | End: 2018-03-07 | Stop reason: SDUPTHER

## 2018-01-31 ASSESSMENT — PATIENT HEALTH QUESTIONNAIRE - PHQ9
5. POOR APPETITE OR OVEREATING: 1 - SEVERAL DAYS
SUM OF ALL RESPONSES TO PHQ QUESTIONS 1-9: 16
CLINICAL INTERPRETATION OF PHQ2 SCORE: 5

## 2018-01-31 NOTE — PROGRESS NOTES
Chief Complaint   Patient presents with   • Fatigue     pt states she is sleeping ok but having ongoing issues with fatigue    • Depression     see depression screening        HPI  22-year-old established female here with her dad. She is here with complaint of depression and fatigue.     #1-fatigue: Reports that she feels tired all the time since around age 18.  Does not feel that she can fall asleep during the day.  No naps or nodding off.  Lately falling asleep easily.  Not waking up in the middle of the night often.  Dad does not think the patient snores but she use to talk in her sleep.  Not waking self up in the middle of the night holding her breath.  Not constipated or loosing hair.  Trouble gaining weight.      #2-depression: Reports having intermittent very low moods, low motivation and as mentioned above feeling tired all the time. Cries easily. Went through a very difficult relationship breakup in August and still struggles with this. Not in school or working.  Stopped school last spring.  Was working at [employer] until 2 mo ago - looking for new job sometimes. Low motivation, sleeping a lot and low appetite.  Plans on starting an exercise program. Denies any self-harm or homicidal ideations. Admits that she has had thoughts regarding how much easier would be if she was not alive. Denies any suicidal plans. Denies any history of an eating disorder or self-harm behaviors. He has never taken medicine for depression. Her brother does have clinical depression and got a lot better with methylfolate. She does plan on seeing a therapist, [facility]. She's never seen psychiatry.    Having monthly menses with OCP.  Nonsmoker.      Past medical, surgical, family, and social history is reviewed and updated in Epic chart by me today.   Medications and allergies reviewed and updated in Epic chart by me today.     ROS:   As documented in history of present illness above    Exam:  Blood pressure (!) 94/68, pulse 83,  "temperature 37.3 °C (99.2 °F), resp. rate 12, height 1.676 m (5' 6\"), weight 48.5 kg (107 lb), SpO2 96 %.    Constitutional: Thin female, Alert, no distress, plus 3 vital signs  Skin:  Warm, dry, no rashes invisible areas  Eye: Equal, round and reactive, conjunctiva clear  ENMT: Lips without lesions, good dentition, oropharynx clear    Neck: Trachea midline, no masses, no thyromegaly  Respiratory: Unlabored respiration  Psych: Alert, pleasant, well-groomed, normal affect    Depression Screening    Little interest or pleasure in doing things?  2 - more than half the days  Feeling down, depressed , or hopeless? 3 - nearly every day  Trouble falling or staying asleep, or sleeping too much?  3 - nearly every day  Feeling tired or having little energy?  3 - nearly every day  Poor appetite or overeating?  1 - several days  Feeling bad about yourself - or that you are a failure or have let yourself or your family down? 1 - several days  Trouble concentrating on things, such as reading the newspaper or watching television? 2 - more than half the days  Moving or speaking so slowly that other people could have noticed.  Or the opposite - being so fidgety or restless that you have been moving around a lot more than usual?  0 - not at all  Thoughts that you would be better off dead, or of hurting yourself?  1 - several days  Patient Health Questionnaire Score: 16      If depressive symptoms identified deferred to follow up visit unless specifically addressed in assesment and plan.    Interpretation of PHQ-9 Total Score   Score Severity   1-4 No Depression   5-9 Mild Depression   10-14 Moderate Depression   15-19 Moderately Severe Depression   20-27 Severe Depression      A/P:  1. Moderate episode of recurrent major depressive disorder (CMS-HCC)  -Recommended below lab work. Certainly encouraged her to start seeing a therapist. Referral placed to psychiatry as typical wait is 3 months and this was discussed with the patient. She " reports that she has no interest in SSRI or SSRI therapy today but is willing to consider taking L-methylfolate she denies any suicidal ideations and particularly no suicidal plans. She does live with her parents and her father reports that they spend a lot of time together. She is going to start an exercise program and then come back to see me in 5 weeks.  - TSH; Future  - FREE THYROXINE; Future  - CBC WITH DIFFERENTIAL; Future  - COMP METABOLIC PANEL; Future  - VITAMIN B12  - FOLATE; Future  - IRON/TOTAL IRON BIND; Future  - Patient has been identified as being depressed and appropriate orders and counseling have been given    2. Fatigue, unspecified type  -Labs as below.  - L-Methylfolate 7.5 MG Tab; Take 1 Tab by mouth every day.  Dispense: 30 Tab; Refill: 5  - TSH; Future  - FREE THYROXINE; Future  - CBC WITH DIFFERENTIAL; Future  - COMP METABOLIC PANEL; Future  - VITAMIN B12  - FOLATE; Future  - IRON/TOTAL IRON BIND; Future    Total face to face time over 25 minutes of which over 50% of this visit is spent in counseling, education and outlining a plan of treatment and coordination of care for the above conditions. This included but was not limited to discussion of medication options and potential risks related to the medications, referral and specialty care options. All patient questions were answered

## 2018-03-07 ENCOUNTER — OFFICE VISIT (OUTPATIENT)
Dept: MEDICAL GROUP | Facility: LAB | Age: 23
End: 2018-03-07
Payer: COMMERCIAL

## 2018-03-07 VITALS
TEMPERATURE: 99.2 F | SYSTOLIC BLOOD PRESSURE: 96 MMHG | RESPIRATION RATE: 12 BRPM | DIASTOLIC BLOOD PRESSURE: 76 MMHG | WEIGHT: 108 LBS | OXYGEN SATURATION: 98 % | BODY MASS INDEX: 17.36 KG/M2 | HEIGHT: 66 IN | HEART RATE: 79 BPM

## 2018-03-07 DIAGNOSIS — R53.83 FATIGUE, UNSPECIFIED TYPE: ICD-10-CM

## 2018-03-07 DIAGNOSIS — F33.1 MODERATE EPISODE OF RECURRENT MAJOR DEPRESSIVE DISORDER (HCC): ICD-10-CM

## 2018-03-07 DIAGNOSIS — N92.0 MENORRHAGIA WITH REGULAR CYCLE: ICD-10-CM

## 2018-03-07 PROCEDURE — 99214 OFFICE O/P EST MOD 30 MIN: CPT | Performed by: NURSE PRACTITIONER

## 2018-03-07 RX ORDER — LEVOMEFOLATE CALCIUM 7.5 MG TABLET
1 TABLET DAILY
Qty: 30 TAB | Refills: 5 | Status: SHIPPED | OUTPATIENT
Start: 2018-03-07 | End: 2019-03-25

## 2018-03-07 RX ORDER — NORETHINDRONE ACETATE AND ETHINYL ESTRADIOL .02; 1 MG/1; MG/1
1 TABLET ORAL DAILY
Qty: 21 TAB | Refills: 11 | Status: SHIPPED | OUTPATIENT
Start: 2018-03-07 | End: 2019-01-14 | Stop reason: SDUPTHER

## 2018-03-07 NOTE — PROGRESS NOTES
"Chief Complaint   Patient presents with   • Depression     F/V        HPI  22-year-old established female here to follow-up on moods such as depression and anhedonia, also regarding fatigue. She was seen here about a month ago for these issues. She declined starting prescription medications at that time. She was given a prescription of Deplin as her brother does well on this but she did not start it. She did have lab work done which was negative for anemia, diabetes or thyroid dysfunction. Her vitamin B12 was borderline low at 211. Taking b12 supplement at night - feeling slightly better.  Still not working.   Sleep:  Improved - sleeping well.   In PT for exercise  Waking up feeling tired.  Getting 8-9 hours of sleep.    Appetite improved.    appt with a therapist in 2 weeks - [facility].    No SI or HI. No self harm.    Not dating right now.     Contraception: Doing well on Loestrin. No breakthrough bleeding or spotting. Has a very light, nonpainful. On Loestrin. Prior to taking Loestrin she had very heavy and painful menstrual periods. Not currently sexually active.     Past medical, surgical, family, and social history is reviewed and updated in Epic chart by me today.   Medications and allergies reviewed and updated in Epic chart by me today.     ROS:   As documented in history of present illness above    Exam:  Blood pressure (!) 96/76, pulse 79, temperature 37.3 °C (99.2 °F), resp. rate 12, height 1.676 m (5' 6\"), weight 49 kg (108 lb), SpO2 98 %.  Constitutional: Alert, no distress, plus 3 vital signs  Skin:  Warm, dry, no rashes invisible areas  Eye: Equal, round and reactive, conjunctiva clear  ENMT: Lips without lesions  Neck: Trachea midline  Respiratory: Unlabored respiration  Cardiovascular: Normal rate   Psych: Alert, pleasant, well-groomed, normal affect    A/P:  1. Moderate episode of recurrent major depressive disorder (CMS-HCC)  -encouraged her to start deplin as a trial for one month.  [place] " encouraged her to start participating in something that she enjoys such as working with animals. Encouraged her to continue with daily exercise. No SI or HI. Follow-up in 2 months after continuing physical therapy, exercising, starting Deplin, B12 and seeing a therapist.  - L-Methylfolate 7.5 MG Tab; Take 1 Tab by mouth every day.  Dispense: 30 Tab; Refill: 5    2. Fatigue, unspecified type  -stable with B12 addition.    - L-Methylfolate 7.5 MG Tab; Take 1 Tab by mouth every day.  Dispense: 30 Tab; Refill: 5    3. Menorrhagia with regular cycle  -Pt counseled on birth control pills. Risks, benefits and complications from hormone use reviewed. Failure rates discussed. Back up contraception and STD prevention discussed.   - norethindrone-ethinyl estradiol (LOESTRIN 1/20, 21,) 1-20 MG-MCG per tablet; Take 1 Tab by mouth every day.  Dispense: 21 Tab; Refill: 11

## 2018-04-27 ENCOUNTER — TELEPHONE (OUTPATIENT)
Dept: MEDICAL GROUP | Facility: LAB | Age: 23
End: 2018-04-27

## 2018-04-27 NOTE — TELEPHONE ENCOUNTER
"2. Physical Therapy paperwork received from [facility] requiring provider signature.     3. All appropriate fields completed by Medical Assistant: N/A CMA printed and distributed to MA    4. Paperwork placed in \"MA to Provider\" folder/basket. Awaiting provider completion/signature.  "

## 2018-05-08 ENCOUNTER — OFFICE VISIT (OUTPATIENT)
Dept: MEDICAL GROUP | Facility: LAB | Age: 23
End: 2018-05-08
Payer: COMMERCIAL

## 2018-05-08 VITALS
DIASTOLIC BLOOD PRESSURE: 68 MMHG | HEART RATE: 100 BPM | TEMPERATURE: 101 F | SYSTOLIC BLOOD PRESSURE: 92 MMHG | BODY MASS INDEX: 17.68 KG/M2 | WEIGHT: 110 LBS | HEIGHT: 66 IN | OXYGEN SATURATION: 96 % | RESPIRATION RATE: 12 BRPM

## 2018-05-08 DIAGNOSIS — F32.89 OTHER DEPRESSION: ICD-10-CM

## 2018-05-08 DIAGNOSIS — L85.3 DRY SKIN: ICD-10-CM

## 2018-05-08 DIAGNOSIS — N92.0 MENORRHAGIA WITH REGULAR CYCLE: ICD-10-CM

## 2018-05-08 DIAGNOSIS — M54.2 POSTERIOR NECK PAIN: ICD-10-CM

## 2018-05-08 PROCEDURE — 99214 OFFICE O/P EST MOD 30 MIN: CPT | Performed by: NURSE PRACTITIONER

## 2018-05-08 ASSESSMENT — PATIENT HEALTH QUESTIONNAIRE - PHQ9: CLINICAL INTERPRETATION OF PHQ2 SCORE: 0

## 2018-05-08 NOTE — PROGRESS NOTES
"Chief Complaint   Patient presents with   • Depression     F/V on depression & moods        HPI  Jm is a 22-year-old established female here to follow-up on depression. At our last visit we initiated Deplin as that worked well for her older brother and she is happy to report that her moods have improved. She is also doing well with a therapist at [facility], seeing her once weekly and feels this may good working relationship. Most days she feels much more energetic and optimistic. Rare tearfulness. Sleeping well at night with the exception of some irritating neck pain that she is going through but this is improving with physical therapy. Appetite is improved and she's actually gained 2 pounds since March. Denies any suicidal or homicidal ideations. Denies any negative side effects of Deplin.    She is curious as to what she can do for really dry skin on her hands. She did benefit from using Lidex last year but forgot that she had this. She does put on lotion a few times per day. She washes her hands frequently when sheis dealing with her animals.    Menorrhagia: Doing well with Loestrin. She did have one episode in which she had 24 hours of bleeding a few days after she finished her menstrual period and wants to know if this is normal. She does admit that she has difficulty remembering her birth control pill every day. She is not currently sexually active. She feels better on a birth control pill, stating that she likes the mood and hormone stability. She does not smoke and has never had a blood clot.    Past medical, surgical, family, and social history is reviewed and updated in Epic chart by me today.   Medications and allergies reviewed and updated in Epic chart by me today.     ROS:   As documented in history of present illness above    Exam:  Blood pressure (!) 92/68, pulse 100, temperature (!) 38.3 °C (101 °F), resp. rate 12, height 1.676 m (5' 6\"), weight 49.9 kg (110 lb), SpO2 96 %.  Gen. appears " healthy in no distress   Skin appropriate for sex and age   HEENT unremarkable    Lungs clear   Heart regular   Musculoskeletal: She does have left-sided proximal cervical paraspinal muscular tenderness but no bony/midline cervical tenderness. She has full range of motion of her neck.  Neuro gait and station normal   Psych appropriate, calm, interactive, well groomed, makes good eye contact, smiles and laughs easily.      A/P:  1. Posterior neck pain  -She reports that this is not really improving with physical therapy but she's only been focusing on her neck in physical therapy for 2 weeks. I did offer her a referral to physiatry but she declines, stating she'll email me in another week or 2 if her neck pain does not start improving with physical therapy.    2. Other depression  -Improving. Continue Deplin and therapy.    3. Dry skin  -Discussed emollient moisturizers, barriers at night such as Aquaphor, avoidance of harsh soaps and utilization of Lidex cyclically.    4. Menorrhagia with regular cycle  -Discussed that it is certainly normal to have 24 hours of breakthrough bleeding, particularly if she has difficulty remembering her pill on a daily basis.

## 2018-07-05 ENCOUNTER — OFFICE VISIT (OUTPATIENT)
Dept: URGENT CARE | Facility: CLINIC | Age: 23
End: 2018-07-05
Payer: COMMERCIAL

## 2018-07-05 VITALS
RESPIRATION RATE: 15 BRPM | WEIGHT: 110 LBS | OXYGEN SATURATION: 95 % | HEIGHT: 66 IN | BODY MASS INDEX: 17.68 KG/M2 | DIASTOLIC BLOOD PRESSURE: 62 MMHG | HEART RATE: 67 BPM | TEMPERATURE: 99 F | SYSTOLIC BLOOD PRESSURE: 122 MMHG

## 2018-07-05 DIAGNOSIS — R10.9 BELLY PAIN: ICD-10-CM

## 2018-07-05 LAB
APPEARANCE UR: CLEAR
BILIRUB UR STRIP-MCNC: NEGATIVE MG/DL
COLOR UR AUTO: NORMAL
GLUCOSE UR STRIP.AUTO-MCNC: NEGATIVE MG/DL
KETONES UR STRIP.AUTO-MCNC: NEGATIVE MG/DL
LEUKOCYTE ESTERASE UR QL STRIP.AUTO: NEGATIVE
NITRITE UR QL STRIP.AUTO: NEGATIVE
PH UR STRIP.AUTO: NEGATIVE [PH] (ref 5–8)
PROT UR QL STRIP: 5 MG/DL
RBC UR QL AUTO: NEGATIVE
SP GR UR STRIP.AUTO: 1
UROBILINOGEN UR STRIP-MCNC: NEGATIVE MG/DL

## 2018-07-05 PROCEDURE — 99214 OFFICE O/P EST MOD 30 MIN: CPT | Performed by: FAMILY MEDICINE

## 2018-07-05 PROCEDURE — 81002 URINALYSIS NONAUTO W/O SCOPE: CPT | Performed by: FAMILY MEDICINE

## 2018-07-05 RX ORDER — IBUPROFEN 800 MG/1
800 TABLET ORAL EVERY 8 HOURS PRN
Qty: 20 TAB | Refills: 3 | Status: SHIPPED | OUTPATIENT
Start: 2018-07-05 | End: 2019-03-25

## 2018-07-05 RX ORDER — PHENAZOPYRIDINE HYDROCHLORIDE 200 MG/1
200 TABLET, FILM COATED ORAL 3 TIMES DAILY PRN
Qty: 6 TAB | Refills: 0 | Status: SHIPPED | OUTPATIENT
Start: 2018-07-05 | End: 2019-03-12

## 2018-07-05 RX ORDER — NITROFURANTOIN 25; 75 MG/1; MG/1
100 CAPSULE ORAL 2 TIMES DAILY
Qty: 10 CAP | Refills: 0 | Status: SHIPPED | OUTPATIENT
Start: 2018-07-05 | End: 2018-07-10

## 2018-07-05 ASSESSMENT — ENCOUNTER SYMPTOMS
CHILLS: 0
ABDOMINAL PAIN: 1
VOMITING: 0
DIZZINESS: 0
FOCAL WEAKNESS: 0
NAUSEA: 0
FEVER: 0

## 2018-07-06 NOTE — PROGRESS NOTES
"Subjective:      Doug Arora is a 22 y.o. female who presents with Dysuria (x 2 days)    Chief Complaint   Patient presents with   • Dysuria     x 2 days        - This is a very pleasant 22 y.o. female with complaints of lower pelvic pain x 1 day, worse w/ urination, there is some urinary freq and pressure feeling           ALLERGIES:  Patient has no known allergies.     PMH:  History reviewed. No pertinent past medical history.     MEDS:    Current Outpatient Prescriptions:   •  nitrofurantoin monohydr macro (MACROBID) 100 MG Cap, Take 1 Cap by mouth 2 times a day for 5 days., Disp: 10 Cap, Rfl: 0  •  phenazopyridine (PYRIDIUM) 200 MG Tab, Take 1 Tab by mouth 3 times a day as needed., Disp: 6 Tab, Rfl: 0  •  ibuprofen (MOTRIN) 800 MG Tab, Take 1 Tab by mouth every 8 hours as needed., Disp: 20 Tab, Rfl: 3  •  norethindrone-ethinyl estradiol (LOESTRIN 1/20, 21,) 1-20 MG-MCG per tablet, Take 1 Tab by mouth every day., Disp: 21 Tab, Rfl: 11  •  L-Methylfolate 7.5 MG Tab, Take 1 Tab by mouth every day., Disp: 30 Tab, Rfl: 5  •  fluocinonide (LIDEX) 0.05 % Cream, Apply 1 Application to affected area(s) 2 times a day., Disp: 30 g, Rfl: 0    ** I have documented what I find to be significant in regards to past medical, social, family and surgical history  in my HPI or under PMH/PSH/FH review section, otherwise it is contributory **           HPI    Review of Systems   Constitutional: Negative for chills and fever.   Gastrointestinal: Positive for abdominal pain. Negative for nausea and vomiting.   Genitourinary: Positive for dysuria and frequency.   Neurological: Negative for dizziness and focal weakness.          Objective:     /62   Pulse 67   Temp 37.2 °C (99 °F)   Resp 15   Ht 1.676 m (5' 6\")   Wt 49.9 kg (110 lb)   SpO2 95%   Breastfeeding? No   BMI 17.75 kg/m²      Physical Exam   Constitutional: She appears well-developed. No distress.   HENT:   Head: Normocephalic and atraumatic.   Neck: " Neck supple.   Cardiovascular: Regular rhythm.    No murmur heard.  Pulmonary/Chest: Effort normal. No respiratory distress.   Abdominal: Soft. Bowel sounds are normal. There is no tenderness. There is no rebound and no guarding.   Neurological: She is alert. She exhibits normal muscle tone.   Skin: Skin is warm and dry.   Psychiatric: She has a normal mood and affect. Judgment normal.   Nursing note and vitals reviewed.              Assessment/Plan:         1. Belly pain  nitrofurantoin monohydr macro (MACROBID) 100 MG Cap    phenazopyridine (PYRIDIUM) 200 MG Tab    ibuprofen (MOTRIN) 800 MG Tab             Dx & d/c instructions discussed w/ patient and/or family members. Follow up w/ Prvt Dr or here in 3-4 days if not getting better, sooner if needed,  ER if worse and UC/PCP unavailable.        Possible side effects (i.e. Rash, GI upset/constipation, sedation, elevation of BP or sugars) of any medications given discussed.

## 2019-01-14 DIAGNOSIS — N92.0 MENORRHAGIA WITH REGULAR CYCLE: ICD-10-CM

## 2019-01-14 RX ORDER — NORETHINDRONE ACETATE AND ETHINYL ESTRADIOL .02; 1 MG/1; MG/1
1 TABLET ORAL DAILY
Qty: 21 TAB | Refills: 11 | Status: SHIPPED | OUTPATIENT
Start: 2019-01-14 | End: 2019-12-14

## 2019-01-24 ENCOUNTER — OFFICE VISIT (OUTPATIENT)
Dept: MEDICAL GROUP | Facility: LAB | Age: 24
End: 2019-01-24
Payer: COMMERCIAL

## 2019-01-24 VITALS
HEART RATE: 82 BPM | TEMPERATURE: 98.7 F | WEIGHT: 113 LBS | HEIGHT: 66 IN | DIASTOLIC BLOOD PRESSURE: 72 MMHG | RESPIRATION RATE: 12 BRPM | SYSTOLIC BLOOD PRESSURE: 118 MMHG | OXYGEN SATURATION: 98 % | BODY MASS INDEX: 18.16 KG/M2

## 2019-01-24 DIAGNOSIS — Z28.39 IMMUNIZATIONS INCOMPLETE: ICD-10-CM

## 2019-01-24 DIAGNOSIS — F41.9 ANXIETY: ICD-10-CM

## 2019-01-24 PROCEDURE — 90716 VAR VACCINE LIVE SUBQ: CPT | Performed by: NURSE PRACTITIONER

## 2019-01-24 PROCEDURE — 90707 MMR VACCINE SC: CPT | Performed by: NURSE PRACTITIONER

## 2019-01-24 PROCEDURE — 90713 POLIOVIRUS IPV SC/IM: CPT | Performed by: NURSE PRACTITIONER

## 2019-01-24 PROCEDURE — 90472 IMMUNIZATION ADMIN EACH ADD: CPT | Performed by: NURSE PRACTITIONER

## 2019-01-24 PROCEDURE — 90746 HEPB VACCINE 3 DOSE ADULT IM: CPT | Performed by: NURSE PRACTITIONER

## 2019-01-24 PROCEDURE — 90471 IMMUNIZATION ADMIN: CPT | Performed by: NURSE PRACTITIONER

## 2019-01-24 PROCEDURE — 99213 OFFICE O/P EST LOW 20 MIN: CPT | Mod: 25 | Performed by: NURSE PRACTITIONER

## 2019-01-24 RX ORDER — ESCITALOPRAM OXALATE 10 MG/1
10 TABLET ORAL DAILY
Qty: 30 TAB | Refills: 5 | Status: SHIPPED | OUTPATIENT
Start: 2019-01-24 | End: 2019-07-24 | Stop reason: SDUPTHER

## 2019-01-24 ASSESSMENT — PATIENT HEALTH QUESTIONNAIRE - PHQ9: CLINICAL INTERPRETATION OF PHQ2 SCORE: 0

## 2019-01-24 NOTE — PROGRESS NOTES
"Chief Complaint   Patient presents with   • Anxiety   • Other     DISCUSS BEING VACCINATED       HPI  23-year-old established female here with 2 issues:  1.-Anxiety: Chronic issue for the patient.  Has suffered from anxiety and depression for years.  Tells me her depression is been doing really well.  She suffers from anxiety on a day-to-day basis and is having panic attacks sometimes a few times per week.  Anxiety symptoms involve tightness to her chest and stomach, difficulty breathing, feeling shaky and feeling her heart race.  Feels that she over worries.  She is seeing Beto Moise at [facility] for talk therapy which she finds extremely helpful.  Denies any suicidal or homicidal ideation.  Denies any self-harm behaviors.  She has never taken SSRI therapy, only propanolol for her anxiety.  She would like to try medication for anxiety.    2.-Immunizations incomplete:  Throughout childhood her parents did not believe in vaccinations, therefore she has never been vaccinated and she would like to start a catch-up schedule.  She is feeling well today.  No immunocompromise.  No drug allergies.    Past medical, surgical, family, and social history is reviewed and updated in Epic chart by me today.   Medications and allergies reviewed and updated in Epic chart by me today.     ROS:   As documented in history of present illness above    Exam:  Blood pressure 118/72, pulse 82, temperature 37.1 °C (98.7 °F), resp. rate 12, height 1.676 m (5' 6\"), weight 51.3 kg (113 lb), last menstrual period 01/09/2019, SpO2 98 %, not currently breastfeeding.  Constitutional: Alert, no distress, plus 3 vital signs  Skin:  Warm, dry, no rashes invisible areas  Eye: Equal, round and reactive, conjunctiva clear  ENMT: Lips without lesions, good dentition, oropharynx clear    Neck: Trachea midline, no masses, no thyromegaly  Respiratory: Unlabored respiration, lungs clear to auscultation, no wheezes, no " rhonchi  Cardiovascular: Normal rate and rhythm  Psych: Alert, pleasant, well-groomed, normal affect.  Makes good eye contact and answers questions appropriately.    Assessment / Plan / Medical Decision makin. Anxiety  -This is a chronic issue but not stable.  Started her on Lexapro 10 mg and schedule a follow-up for 4 weeks.  She will continue with talk therapy.  Denies depression, suicidal or homicidal ideations.  Not harming herself.  Discussed potential side effects as well as length of onset efficacy of Lexapro.  - escitalopram (LEXAPRO) 10 MG Tab; Take 1 Tab by mouth every day.  Dispense: 30 Tab; Refill: 5    2. Immunizations incomplete  -We will start a catch-up schedule with this patient.  Began with the 4 vaccinations as below and she will follow-up in 4 weeks for repeat IPV, MMR, varicella and hepatitis B.  Approximately 1 month later we can start hepatitis A, Gardasil and Tdap.  - Hepatitis B Vaccine Adult IM  - Poliovirus Vaccine IPV SQ/IM  - Varicella Vaccine SQ  - MMR Vaccine SQ    The patient was counseled regarding all immunizations, what the patient is being immunized against, possible side effects, and the importance of immunization.

## 2019-03-12 ENCOUNTER — OFFICE VISIT (OUTPATIENT)
Dept: MEDICAL GROUP | Facility: LAB | Age: 24
End: 2019-03-12
Payer: COMMERCIAL

## 2019-03-12 ENCOUNTER — HOSPITAL ENCOUNTER (OUTPATIENT)
Facility: MEDICAL CENTER | Age: 24
End: 2019-03-12
Attending: NURSE PRACTITIONER
Payer: COMMERCIAL

## 2019-03-12 VITALS
RESPIRATION RATE: 12 BRPM | HEART RATE: 80 BPM | HEIGHT: 66 IN | SYSTOLIC BLOOD PRESSURE: 98 MMHG | WEIGHT: 110 LBS | BODY MASS INDEX: 17.68 KG/M2 | OXYGEN SATURATION: 96 % | DIASTOLIC BLOOD PRESSURE: 60 MMHG | TEMPERATURE: 98 F

## 2019-03-12 DIAGNOSIS — Z12.4 SCREENING FOR MALIGNANT NEOPLASM OF CERVIX: ICD-10-CM

## 2019-03-12 DIAGNOSIS — Z01.419 ENCOUNTER FOR GYNECOLOGICAL EXAMINATION: ICD-10-CM

## 2019-03-12 DIAGNOSIS — Z23 NEED FOR VACCINATION: ICD-10-CM

## 2019-03-12 PROCEDURE — 88175 CYTOPATH C/V AUTO FLUID REDO: CPT

## 2019-03-12 PROCEDURE — 87491 CHLMYD TRACH DNA AMP PROBE: CPT

## 2019-03-12 PROCEDURE — 90471 IMMUNIZATION ADMIN: CPT | Performed by: NURSE PRACTITIONER

## 2019-03-12 PROCEDURE — 90636 HEP A/HEP B VACC ADULT IM: CPT | Performed by: NURSE PRACTITIONER

## 2019-03-12 PROCEDURE — 90716 VAR VACCINE LIVE SUBQ: CPT | Performed by: NURSE PRACTITIONER

## 2019-03-12 PROCEDURE — 99395 PREV VISIT EST AGE 18-39: CPT | Mod: 25 | Performed by: NURSE PRACTITIONER

## 2019-03-12 PROCEDURE — 90715 TDAP VACCINE 7 YRS/> IM: CPT | Performed by: NURSE PRACTITIONER

## 2019-03-12 PROCEDURE — 90713 POLIOVIRUS IPV SC/IM: CPT | Performed by: NURSE PRACTITIONER

## 2019-03-12 PROCEDURE — 87591 N.GONORRHOEAE DNA AMP PROB: CPT

## 2019-03-12 PROCEDURE — 90472 IMMUNIZATION ADMIN EACH ADD: CPT | Performed by: NURSE PRACTITIONER

## 2019-03-12 NOTE — PROGRESS NOTES
Chief Complaint   Patient presents with   • Annual Exam       HPI:  Doug is a 23 y.o.  female who presents for annual exam. Generally the patient is feeling good. She has no complaints or concerns.  Anxiety is doing great with Lexapro.  Tolerated immunization catch-up last month.  Regarding her health maintenance:   Last pap: Never  Abnormal Pap hx: None  Periods: Monthly in between pill packs  Contraception: Loestrin  Last Mammo:not applicable   Last colonoscopy:not applicable   Bone density test:N\A   Last Td: Due today  Influenza vaccination: Declines  Pneumococcal vaccination:not applicable   Zostavax:not applicable   Hx STD''s: no   Regular exercise: yes      meds:   Current Outpatient Prescriptions   Medication Sig Dispense Refill   • escitalopram (LEXAPRO) 10 MG Tab Take 1 Tab by mouth every day. 30 Tab 5   • norethindrone-ethinyl estradiol (LOESTRIN 1/20, 21,) 1-20 MG-MCG per tablet Take 1 Tab by mouth every day. 21 Tab 11   • phenazopyridine (PYRIDIUM) 200 MG Tab Take 1 Tab by mouth 3 times a day as needed. 6 Tab 0   • ibuprofen (MOTRIN) 800 MG Tab Take 1 Tab by mouth every 8 hours as needed. 20 Tab 3   • L-Methylfolate 7.5 MG Tab Take 1 Tab by mouth every day. 30 Tab 5   • fluocinonide (LIDEX) 0.05 % Cream Apply 1 Application to affected area(s) 2 times a day. 30 g 0     No current facility-administered medications for this visit.        Allergies: No Known Allergies    family:   No family history on file.    social hx:   Social History     Social History   • Marital status: Single     Spouse name: N/A   • Number of children: N/A   • Years of education: N/A     Occupational History   • Not on file.     Social History Main Topics   • Smoking status: Never Smoker   • Smokeless tobacco: Never Used   • Alcohol use No   • Drug use: No   • Sexual activity: No     Other Topics Concern   • Not on file     Social History Narrative   • No narrative on file       ROS:  No fever, chills, sweats.   No polydipsia,  "polyuria, temperature intolerance, significant weight changes   No visual changes, blurred vision.  No chest pain, palpitations, peripheral swelling   No chronic cough, shortness of breath, dyspnea with exertion.   No dysphagia, odynophagia, black or bloody stools.   No abdominal pain, nausea, persistent diarrhea, constipation   No dysuria, hematuria, incontinence. Denies nocturia  No rash, pruritis, pigment changes.   No focal weakness, syncope, headache, confusion, persistent numbness.   All other systems are reviewed and negative.    PHYSICAL EXAMINATION:  Blood pressure (!) 98/60, pulse 80, temperature 36.7 °C (98 °F), resp. rate 12, height 1.676 m (5' 6\"), weight 49.9 kg (110 lb), last menstrual period 03/05/2019, SpO2 96 %, not currently breastfeeding.  General appearance:healthy, well developed, well nourished  Psych: alert, no distress, cooperative  Eyes: EOM's normal, pupils equal, round, reactive to light  ENT: Ears: external ears normal to inspection and palpation, TM's clear, Nose/Sinuses: nose shows no deformity, asymmetry, or inflammation  Neck: no asymmetry, masses, or scars, no adenopathy, thyroid normal to palpation  Lungs:chest symmetric with normal A/P diameter, no chest deformities noted, normal respiratory rate and rhythm  Cardiovascular:regular rate and rhythm, S1 normal  Breasts: normal in size and symmetry, skin normal, physiologic fibronodularity  Abdomen: umbilicus normal, no masses palpable, no organomegaly  Musculoskeletal:ROM of all joints is normal, no evidence of joint instability  Lymphatic: None significantly enlarged  Skin: no rash, no edema  Neuro: mental status intact, cranial nerves 2-12 intact  Pelvic: external genitalia normal, cervix normal in appearance, bimanual exam reveals normal uterus, adnexa without masses or tenderness, vaginal mucosa normal      ASSESSMENT/PLAN:  1.annual physical exam: HCM:  Pap and breast exams done.  BSE technique reviewed and patient encouraged to " perform self-exam monthly.   Encourage monthly self breast exam  Encourage daily exercise for at least 30 minutes  Recommend 1500 mg Calcium with 600 units vit d daily.    Follow up one year for annual PAP.  Pap smear today included gonorrhea/chlamydia.  2.  Need for vaccine catch-up:  Today given hepatitis B #2, hepatitis A #1, IPV #2 and Tdap.  She will need a repeat/third hep B in 6 months, second hep A in 6 months and third polio in 6 months.  Recommend also starting HPV vaccines at her next visit.  I have placed the above orders and discussed them with Dr. Waddell. the MA is performing the orders under the direction of Dr. MEJIA

## 2019-03-14 DIAGNOSIS — Z12.4 SCREENING FOR MALIGNANT NEOPLASM OF CERVIX: ICD-10-CM

## 2019-03-15 LAB
C TRACH DNA GENITAL QL NAA+PROBE: NEGATIVE
CYTOLOGY REG CYTOL: NORMAL
N GONORRHOEA DNA GENITAL QL NAA+PROBE: NEGATIVE
SPECIMEN SOURCE: NORMAL

## 2019-03-25 ENCOUNTER — OFFICE VISIT (OUTPATIENT)
Dept: URGENT CARE | Facility: CLINIC | Age: 24
End: 2019-03-25
Payer: COMMERCIAL

## 2019-03-25 ENCOUNTER — SUPERVISING PHYSICIAN REVIEW (OUTPATIENT)
Dept: URGENT CARE | Facility: CLINIC | Age: 24
End: 2019-03-25

## 2019-03-25 VITALS
SYSTOLIC BLOOD PRESSURE: 100 MMHG | DIASTOLIC BLOOD PRESSURE: 80 MMHG | RESPIRATION RATE: 17 BRPM | OXYGEN SATURATION: 96 % | TEMPERATURE: 99 F | BODY MASS INDEX: 18.64 KG/M2 | HEART RATE: 63 BPM | WEIGHT: 116 LBS | HEIGHT: 66 IN
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
## 2019-03-25 PROCEDURE — 99214 OFFICE O/P EST MOD 30 MIN: CPT | Performed by: NURSE PRACTITIONER

## 2019-03-25 RX ORDER — AMOXICILLIN AND CLAVULANATE POTASSIUM 875; 125 MG/1; MG/1
1 TABLET, FILM COATED ORAL 2 TIMES DAILY
Qty: 14 TAB | Refills: 0 | Status: SHIPPED | OUTPATIENT
Start: 2019-03-25 | End: 2019-04-01

## 2019-03-25 ASSESSMENT — ENCOUNTER SYMPTOMS
FATIGUE: 0
NAUSEA: 0
SORE THROAT: 0
SHORTNESS OF BREATH: 0
CHILLS: 0
COUGH: 0
MYALGIAS: 0
FEVER: 0
DIZZINESS: 0
VOMITING: 0
SWOLLEN GLANDS: 0
ARTHRALGIAS: 0
EYE PAIN: 0

## 2019-03-25 NOTE — PROGRESS NOTES
"Subjective:   Doug Arora is a 23 y.o. female who presents for Oral Swelling (upper lip, two days ago dog hit top of her lip with his tooth, and has little swelling and small blister, hurts)        Animal Bite   This is a new problem. Episode onset: 2 days. The problem occurs constantly. The problem has been unchanged. Pertinent negatives include no arthralgias, chest pain, chills, coughing, fatigue, fever, myalgias, nausea, rash, sore throat, swollen glands or vomiting. Nothing aggravates the symptoms. She has tried nothing for the symptoms. The treatment provided no relief.   Patient is up-to-date on tetanus.  Review of Systems   Constitutional: Negative for chills, fatigue and fever.   HENT: Negative for sore throat.    Eyes: Negative for pain.   Respiratory: Negative for cough and shortness of breath.    Cardiovascular: Negative for chest pain.   Gastrointestinal: Negative for nausea and vomiting.   Genitourinary: Negative for hematuria.   Musculoskeletal: Negative for arthralgias and myalgias.   Skin: Negative for rash.   Neurological: Negative for dizziness.     No Known Allergies   Objective:   /80   Pulse 63   Temp 37.2 °C (99 °F) (Temporal)   Resp 17   Ht 1.676 m (5' 6\")   Wt 52.6 kg (116 lb)   LMP 03/05/2019   SpO2 96%   Breastfeeding? No   BMI 18.72 kg/m²   Physical Exam   Constitutional: She is oriented to person, place, and time. She appears well-developed and well-nourished. No distress.   HENT:   Head: Normocephalic and atraumatic.   Right Ear: Tympanic membrane normal.   Left Ear: Tympanic membrane normal.   Nose: Nose normal. Right sinus exhibits no maxillary sinus tenderness and no frontal sinus tenderness. Left sinus exhibits no maxillary sinus tenderness and no frontal sinus tenderness.   Mouth/Throat: Uvula is midline, oropharynx is clear and moist and mucous membranes are normal. No posterior oropharyngeal edema, posterior oropharyngeal erythema or tonsillar abscesses. " No tonsillar exudate.       Eyes: Pupils are equal, round, and reactive to light. Conjunctivae and EOM are normal. Right eye exhibits no discharge. Left eye exhibits no discharge.   Cardiovascular: Normal rate and regular rhythm.    No murmur heard.  Pulmonary/Chest: Effort normal and breath sounds normal. No respiratory distress.   Abdominal: Soft. She exhibits no distension. There is no tenderness.   Lymphadenopathy:     She has no cervical adenopathy.   Neurological: She is alert and oriented to person, place, and time. She has normal reflexes. No sensory deficit.   Skin: Skin is warm, dry and intact.   Psychiatric: She has a normal mood and affect.         Assessment/Plan:   1. Dog bite, initial encounter  - amoxicillin-clavulanate (AUGMENTIN) 875-125 MG Tab; Take 1 Tab by mouth 2 times a day for 7 days.  Dispense: 14 Tab; Refill: 0  Differential diagnosis, natural history, supportive care, and indications for immediate follow-up discussed.

## 2019-06-24 ENCOUNTER — OFFICE VISIT (OUTPATIENT)
Dept: URGENT CARE | Facility: MEDICAL CENTER | Age: 24
End: 2019-06-24
Payer: COMMERCIAL

## 2019-06-24 VITALS
DIASTOLIC BLOOD PRESSURE: 60 MMHG | TEMPERATURE: 99 F | SYSTOLIC BLOOD PRESSURE: 98 MMHG | HEART RATE: 108 BPM | WEIGHT: 119 LBS | HEIGHT: 66 IN | BODY MASS INDEX: 19.13 KG/M2 | OXYGEN SATURATION: 98 %

## 2019-06-24 DIAGNOSIS — R05.9 COUGH: ICD-10-CM

## 2019-06-24 DIAGNOSIS — J01.40 ACUTE PANSINUSITIS, RECURRENCE NOT SPECIFIED: ICD-10-CM

## 2019-06-24 PROCEDURE — 99214 OFFICE O/P EST MOD 30 MIN: CPT | Performed by: NURSE PRACTITIONER

## 2019-06-24 RX ORDER — FLUTICASONE PROPIONATE 50 MCG
1 SPRAY, SUSPENSION (ML) NASAL DAILY
Qty: 16 G | Refills: 0 | Status: SHIPPED
Start: 2019-06-24 | End: 2020-07-31

## 2019-06-24 RX ORDER — BENZONATATE 100 MG/1
100 CAPSULE ORAL 3 TIMES DAILY PRN
Qty: 30 CAP | Refills: 0 | Status: SHIPPED
Start: 2019-06-24 | End: 2020-04-14

## 2019-06-24 RX ORDER — AMOXICILLIN AND CLAVULANATE POTASSIUM 875; 125 MG/1; MG/1
1 TABLET, FILM COATED ORAL 2 TIMES DAILY
Qty: 20 TAB | Refills: 0 | Status: SHIPPED | OUTPATIENT
Start: 2019-06-24 | End: 2019-07-04

## 2019-06-24 ASSESSMENT — ENCOUNTER SYMPTOMS: COUGH: 1

## 2019-06-24 NOTE — PROGRESS NOTES
"Subjective:      Doug Arora is a 23 y.o. female who presents with Cough (x1week chest and sinus congestion, cough, )        Reviewed past medical, surgical and family history with patient. Reviewed prescription and OTC medications with patient in electronic health record today.     No Known Allergies      Cough   This is a new problem. The current episode started in the past 7 days. The problem has been gradually worsening. The problem occurs constantly. The cough is productive of sputum. Associated symptoms include ear pain (pressure), headaches and a sore throat. Pertinent negatives include no chest pain, chills, eye redness or fever. The symptoms are aggravated by other. She has tried OTC cough suppressant (nyquil \" everything over the counter\" ) for the symptoms.       Review of Systems   Constitutional: Negative for chills and fever.   HENT: Positive for congestion, ear pain (pressure), sinus pain and sore throat.    Eyes: Negative for blurred vision, discharge and redness.   Respiratory: Positive for cough.    Cardiovascular: Negative for chest pain.   Gastrointestinal: Negative for nausea and vomiting.   Musculoskeletal: Negative for back pain and neck pain.   Neurological: Positive for headaches. Negative for dizziness, sensory change and focal weakness.   Psychiatric/Behavioral: Negative for substance abuse.          Objective:     BP (!) 98/60   Pulse (!) 108   Temp 37.2 °C (99 °F) (Temporal)   Ht 1.676 m (5' 6\")   Wt 54 kg (119 lb)   SpO2 98%   BMI 19.21 kg/m²      Physical Exam   Constitutional: She is oriented to person, place, and time. She appears well-developed and well-nourished. No distress.   HENT:   Head: Normocephalic.   Right Ear: Hearing, tympanic membrane, external ear and ear canal normal.   Left Ear: Hearing, tympanic membrane, external ear and ear canal normal.   Nose: Sinus tenderness present. No mucosal edema or rhinorrhea. Right sinus exhibits frontal sinus tenderness. " Right sinus exhibits no maxillary sinus tenderness. Left sinus exhibits frontal sinus tenderness. Left sinus exhibits no maxillary sinus tenderness.   Mouth/Throat: Uvula is midline and mucous membranes are normal. No uvula swelling. Oropharyngeal exudate (purulent) present.   Eyes: Pupils are equal, round, and reactive to light. Right conjunctiva is injected. Left conjunctiva is injected.   Neck: Trachea normal, normal range of motion, full passive range of motion without pain and phonation normal. Neck supple.   Cardiovascular: Normal rate, regular rhythm, intact distal pulses and normal pulses.  PMI is not displaced.    Pulmonary/Chest: Effort normal and breath sounds normal.   Lymphadenopathy:     She has no cervical adenopathy.        Right: No supraclavicular adenopathy present.        Left: No supraclavicular adenopathy present.   Neurological: She is alert and oriented to person, place, and time. Gait normal.   Skin: Skin is warm and dry. She is not diaphoretic.   Psychiatric: She has a normal mood and affect. Her speech is normal and behavior is normal. Cognition and memory are normal.   Nursing note and vitals reviewed.              Assessment/Plan:     1. Acute pansinusitis, recurrence not specified  fluticasone (FLONASE) 50 MCG/ACT nasal spray    amoxicillin-clavulanate (AUGMENTIN) 875-125 MG Tab   2. Cough  benzonatate (TESSALON) 100 MG Cap       Educated in proper administration of medication(s) ordered today including safety, possible SE, risks, benefits, rationale and alternatives to therapy.     OTC antihistamine of choice. Follow manufactures dosing and safety guidelines.      Humidifier at night prn     Keep well hydrated    Return to clinic or PCP  7-10  days if current symptoms are not resolving in a satisfactory manner or sooner if new or worsening symptoms occur.   Patient was advised of signs and symptoms which would warrant further evaluation and /or emergent evaluation in ER.  Verbalized  agreement with this treatment plan and seemed to understand without barriers. Questions were encouraged and answered to patients satisfaction.     Aftercare instructions were given to pt

## 2019-06-25 ASSESSMENT — ENCOUNTER SYMPTOMS
SORE THROAT: 1
SENSORY CHANGE: 0
HEADACHES: 1
EYE DISCHARGE: 0
VOMITING: 0
EYE REDNESS: 0
NECK PAIN: 0
FOCAL WEAKNESS: 0
BACK PAIN: 0
SINUS PAIN: 1
CHILLS: 0
FEVER: 0
BLURRED VISION: 0
NAUSEA: 0
DIZZINESS: 0

## 2019-06-25 ASSESSMENT — LIFESTYLE VARIABLES: SUBSTANCE_ABUSE: 0

## 2019-07-10 ENCOUNTER — OFFICE VISIT (OUTPATIENT)
Dept: MEDICAL GROUP | Facility: LAB | Age: 24
End: 2019-07-10
Payer: COMMERCIAL

## 2019-07-10 VITALS
WEIGHT: 113 LBS | OXYGEN SATURATION: 96 % | RESPIRATION RATE: 12 BRPM | BODY MASS INDEX: 18.16 KG/M2 | TEMPERATURE: 98.3 F | DIASTOLIC BLOOD PRESSURE: 68 MMHG | SYSTOLIC BLOOD PRESSURE: 90 MMHG | HEIGHT: 66 IN | HEART RATE: 90 BPM

## 2019-07-10 DIAGNOSIS — J01.00 ACUTE NON-RECURRENT MAXILLARY SINUSITIS: ICD-10-CM

## 2019-07-10 PROCEDURE — 99214 OFFICE O/P EST MOD 30 MIN: CPT | Performed by: NURSE PRACTITIONER

## 2019-07-10 RX ORDER — DOXYCYCLINE HYCLATE 100 MG
100 TABLET ORAL 2 TIMES DAILY
Qty: 14 TAB | Refills: 0 | Status: SHIPPED | OUTPATIENT
Start: 2019-07-10 | End: 2019-07-17

## 2019-07-10 NOTE — PROGRESS NOTES
"    YARI Camacho is an est 23-year-old here because she has been \"sick\" x 3 weeks - new issue to me today.  Went to  6/24 for sinus pressure, nasal congestion, cough and not feeling well.  Given 10 d of augmentin, felt better for about 1-2 days and then symptoms returned.  Current symptoms involve nasal congestion, chest congestion, cough, ST.  Occasional mucus production with coughing - yellow.  Nonsmoker.  No sick contacts.  OTC meds:  Sudafed and flonase - both help.  + SOB but no wheezing. Denies fever or chills.  No hx of asthma.    In Summer school.   Not working.      Past medical, surgical, family, and social history is reviewed and updated in Epic chart by me today.   Medications and allergies reviewed and updated in Epic chart by me today.     ROS:   Denies n/v/d or abdominal pain.     Exam:  BP (!) 90/68 (BP Location: Left arm, Patient Position: Sitting, BP Cuff Size: Adult)   Pulse 90   Temp 36.8 °C (98.3 °F)   Resp 12   Ht 1.676 m (5' 6\")   Wt 51.3 kg (113 lb)   SpO2 96%   Constitutional: Alert, no distress, plus 3 vital signs  Skin:  Warm, dry, no rashes invisible areas  Eye: Equal, round and reactive, conjunctiva clear  ENMT: Bilateral tympanic membranes are retracted but translucent without erythema or perforation. Positive bilateral maxillary sinus tenderness. Oropharynx is slightly injected without exudate. No tonsillar enlargement.    Neck: Mild anterior cervical, nontender lymphadenopathy  Respiratory: Unlabored respiration, lungs clear to auscultation, no wheezes, no rhonchi  Cardiovascular: Normal rate and rhythm  Psych: Alert, pleasant, well-groomed, normal affect    A/P:    1. Acute non-recurrent maxillary sinusitis  doxycycline (VIBRAMYCIN) 100 MG Tab     Suspect bacterial resistant sinuitis.  Continue Flonase and Sudafed for symptomatic treatment.  Encouraged high water intake, vitamin C and rest.  Discussed the importance of following up in 10 days if not resolved, sooner with " worsening.

## 2019-07-24 DIAGNOSIS — F41.9 ANXIETY: ICD-10-CM

## 2019-07-25 RX ORDER — ESCITALOPRAM OXALATE 10 MG/1
TABLET ORAL
Qty: 30 TAB | Refills: 5 | Status: SHIPPED | OUTPATIENT
Start: 2019-07-25 | End: 2020-04-14 | Stop reason: SDUPTHER

## 2019-07-25 NOTE — TELEPHONE ENCOUNTER
Was the patient seen in the last year in this department? Yes lov 7/10/19    Does patient have an active prescription for medications requested? No     Received Request Via: Pharmacy

## 2019-10-15 ENCOUNTER — OFFICE VISIT (OUTPATIENT)
Dept: MEDICAL GROUP | Facility: LAB | Age: 24
End: 2019-10-15
Payer: COMMERCIAL

## 2019-10-15 ENCOUNTER — HOSPITAL ENCOUNTER (OUTPATIENT)
Facility: MEDICAL CENTER | Age: 24
End: 2019-10-15
Attending: NURSE PRACTITIONER
Payer: COMMERCIAL

## 2019-10-15 VITALS
BODY MASS INDEX: 20.09 KG/M2 | RESPIRATION RATE: 12 BRPM | TEMPERATURE: 99 F | WEIGHT: 125 LBS | HEART RATE: 70 BPM | SYSTOLIC BLOOD PRESSURE: 100 MMHG | OXYGEN SATURATION: 98 % | DIASTOLIC BLOOD PRESSURE: 62 MMHG | HEIGHT: 66 IN

## 2019-10-15 DIAGNOSIS — N89.8 FOUL SMELLING VAGINAL DISCHARGE: ICD-10-CM

## 2019-10-15 DIAGNOSIS — Z23 NEED FOR VACCINATION: ICD-10-CM

## 2019-10-15 PROCEDURE — 90471 IMMUNIZATION ADMIN: CPT | Performed by: NURSE PRACTITIONER

## 2019-10-15 PROCEDURE — 87510 GARDNER VAG DNA DIR PROBE: CPT

## 2019-10-15 PROCEDURE — 99213 OFFICE O/P EST LOW 20 MIN: CPT | Mod: 25 | Performed by: NURSE PRACTITIONER

## 2019-10-15 PROCEDURE — 87480 CANDIDA DNA DIR PROBE: CPT

## 2019-10-15 PROCEDURE — 90651 9VHPV VACCINE 2/3 DOSE IM: CPT | Performed by: NURSE PRACTITIONER

## 2019-10-15 PROCEDURE — 90621 MENB-FHBP VACC 2/3 DOSE IM: CPT | Performed by: NURSE PRACTITIONER

## 2019-10-15 PROCEDURE — 90632 HEPA VACCINE ADULT IM: CPT | Performed by: NURSE PRACTITIONER

## 2019-10-15 PROCEDURE — 87660 TRICHOMONAS VAGIN DIR PROBE: CPT

## 2019-10-15 PROCEDURE — 90472 IMMUNIZATION ADMIN EACH ADD: CPT | Performed by: NURSE PRACTITIONER

## 2019-10-15 NOTE — PROGRESS NOTES
"Chief Complaint   Patient presents with   • Vaginal Discharge     smelly/ no itch/ no pain X 4 days has gotten better       HPI   23-year-old established female here for vaccine update.  She is also been bothered recently by a vaginal discharge that she describes as smelly.    #1- new onset vaginal smell with thick d/c starting 4 d ago - improving today.  Denies dysuria.  Last sexual encounter was a few months ago.  Denies any vaginal itching, suprapubic pain, pelvic pain or any other associated symptoms.  When she was sexually active a few months ago she states that she used a condom.  She denies any personal history of STDs.  No other associated symptoms or history of the same.      Past medical, surgical, family, and social history is reviewed and updated in Epic chart by me today.   Medications and allergies reviewed and updated in Epic chart by me today.     ROS:   As documented in history of present illness above    Exam:  /62 (BP Location: Left arm, Patient Position: Sitting, BP Cuff Size: Adult)   Pulse 70   Temp 37.2 °C (99 °F)   Resp 12   Ht 1.676 m (5' 6\")   Wt 56.7 kg (125 lb)   SpO2 98%   Constitutional: Alert, no distress, plus 3 vital signs  Skin:  Warm, dry, no rashes invisible areas  Eye: Equal, round and reactive, conjunctiva clear  ENMT: Lips without lesions  Respiratory: Unlabored respiration  Cardiovascular: Normal rate and rhythm  : She declines a pelvic exam today, stating her symptoms have essentially resolved.  Psych: Alert, pleasant, well-groomed, normal affect    Assessment / Plan / Medical Decision makin. Foul smelling vaginal discharge  -Encouraged a vaginal pathogens swab today in case her symptoms return.  Discussed potential differential diagnoses such as yeast or bacterial vaginosis.  If her swab returns negative and symptoms return, I would like for her to have STD screening which she is agreeable to.  I will follow-up with her on email in 72 hours.  - VAGINAL " PATHOGENS DNA PANEL; Future    2. Need for vaccination  -She is catching up on childhood vaccinations that were not given.  She is [place] a college campus.  She will need 2 more HPV vaccines.  - 9VHPV Vaccine 2-3 Dose (GARDASIL 9)  - Meningococcal Vaccine Serogroup B 2-3 Dose (TRUMENBA)  - Hepatitis A Vaccine Adult IM

## 2019-10-16 DIAGNOSIS — N89.8 FOUL SMELLING VAGINAL DISCHARGE: ICD-10-CM

## 2019-10-16 LAB
CANDIDA DNA VAG QL PROBE+SIG AMP: NEGATIVE
G VAGINALIS DNA VAG QL PROBE+SIG AMP: NEGATIVE
T VAGINALIS DNA VAG QL PROBE+SIG AMP: NEGATIVE

## 2019-12-13 DIAGNOSIS — N92.0 MENORRHAGIA WITH REGULAR CYCLE: ICD-10-CM

## 2019-12-15 RX ORDER — NORETHINDRONE ACETATE/ETHINYL ESTRADIOL 1MG-20MCG
KIT ORAL
Qty: 21 TAB | Refills: 11 | Status: SHIPPED | OUTPATIENT
Start: 2019-12-15 | End: 2020-04-14 | Stop reason: SDUPTHER

## 2020-02-12 ENCOUNTER — OFFICE VISIT (OUTPATIENT)
Dept: MEDICAL GROUP | Facility: LAB | Age: 25
End: 2020-02-12
Payer: COMMERCIAL

## 2020-02-12 VITALS
BODY MASS INDEX: 21.69 KG/M2 | TEMPERATURE: 97.8 F | RESPIRATION RATE: 12 BRPM | OXYGEN SATURATION: 96 % | WEIGHT: 135 LBS | SYSTOLIC BLOOD PRESSURE: 100 MMHG | DIASTOLIC BLOOD PRESSURE: 70 MMHG | HEART RATE: 66 BPM | HEIGHT: 66 IN

## 2020-02-12 DIAGNOSIS — Z23 NEED FOR MMR VACCINE: ICD-10-CM

## 2020-02-12 DIAGNOSIS — Z23 NEED FOR MENINGITIS VACCINATION: ICD-10-CM

## 2020-02-12 DIAGNOSIS — Z23 NEED FOR HPV VACCINATION: ICD-10-CM

## 2020-02-12 DIAGNOSIS — Z23 NEED FOR HEPATITIS B VACCINATION: ICD-10-CM

## 2020-02-12 DIAGNOSIS — G89.29 CHRONIC BILATERAL LOW BACK PAIN WITHOUT SCIATICA: ICD-10-CM

## 2020-02-12 DIAGNOSIS — M54.50 CHRONIC BILATERAL LOW BACK PAIN WITHOUT SCIATICA: ICD-10-CM

## 2020-02-12 DIAGNOSIS — Z28.39 IMMUNIZATION DEFICIENCY: ICD-10-CM

## 2020-02-12 DIAGNOSIS — Z71.85 IMMUNIZATION COUNSELING: ICD-10-CM

## 2020-02-12 PROCEDURE — 90471 IMMUNIZATION ADMIN: CPT | Performed by: NURSE PRACTITIONER

## 2020-02-12 PROCEDURE — 90472 IMMUNIZATION ADMIN EACH ADD: CPT | Performed by: NURSE PRACTITIONER

## 2020-02-12 PROCEDURE — 90651 9VHPV VACCINE 2/3 DOSE IM: CPT | Performed by: NURSE PRACTITIONER

## 2020-02-12 PROCEDURE — 90734 MENACWYD/MENACWYCRM VACC IM: CPT | Performed by: NURSE PRACTITIONER

## 2020-02-12 PROCEDURE — 99213 OFFICE O/P EST LOW 20 MIN: CPT | Mod: 25 | Performed by: NURSE PRACTITIONER

## 2020-02-12 PROCEDURE — 90707 MMR VACCINE SC: CPT | Performed by: NURSE PRACTITIONER

## 2020-02-12 PROCEDURE — 90746 HEPB VACCINE 3 DOSE ADULT IM: CPT | Performed by: NURSE PRACTITIONER

## 2020-02-12 ASSESSMENT — PATIENT HEALTH QUESTIONNAIRE - PHQ9: CLINICAL INTERPRETATION OF PHQ2 SCORE: 0

## 2020-02-12 NOTE — PROGRESS NOTES
"CC  Follow-up    HPI  24-year-old established female here with complaint of chronic low back pain, wondering if there is anything that she can do.  Prefers not to take any medications.  Has tried physical therapy without long-term improvement.  Tells me that she has known scoliosis.  Denies numbness or tingling in her extremities.  Denies recent injuries.    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    Past medical, surgical, family, and social history is reviewed and updated in Epic chart by me today.   Medications and allergies reviewed and updated in Epic chart by me today.     ROS:   As documented in history of present illness above    Exam:  /70 (BP Location: Left arm, Patient Position: Sitting, BP Cuff Size: Adult)   Pulse 66   Temp 36.6 °C (97.8 °F)   Resp 12   Ht 1.676 m (5' 6\")   Wt 61.2 kg (135 lb)   SpO2 96%   Constitutional: Alert, no distress, plus 3 vital signs  Skin:  Warm, dry, no rashes invisible areas  Eye: Equal, round and reactive, conjunctiva clear  ENMT: Lips without lesions.  Respiratory: Unlabored respiration.  Cardiovascular: Normal rate and rhythm.  Psych: Alert, pleasant, well-groomed, normal affect    Assessment / Plan / Medical Decision makin. Immunization deficiency    2. Immunization counseling    3. Need for MMR vaccine  -#2 MMR today  - MMR Vaccine SQ    4. Need for hepatitis B vaccination  Hep b #3 today  - Hepatitis B Vaccine Adult IM    5. Need for meningitis vaccination  -first and only indicated  - Meningococcal Conjugate Vaccine 4-Valent IM (Menactra)    6. Need for HPV vaccination  -HPV #2 today - f/u 2 mo for #3 HPV - this will be 6 mo from first HPV  - 9VHPV Vaccine 2-3 Dose IM      F/u 2 mo for HPV #3 and trumenba #2.      7.  Chronic low back pain:  Discussed treatment options such as returning to physical therapy, physiatry consult, imaging, anti-inflammatories and gabapentin.  Ultimately we mutually decided to stick with " conservative treatment and start an intensive trunk strengthening/stretching program.  She will notify me if this is not helping.  We had a long discussion about how to strengthen her trunk.    The patient was counseled regarding all immunizations, what the patient is being immunized against, possible side effects, and the importance of immunization.

## 2020-02-22 ENCOUNTER — NON-PROVIDER VISIT (OUTPATIENT)
Dept: URGENT CARE | Facility: PHYSICIAN GROUP | Age: 25
End: 2020-02-22
Payer: COMMERCIAL

## 2020-02-22 VITALS
HEART RATE: 86 BPM | HEIGHT: 66 IN | RESPIRATION RATE: 16 BRPM | DIASTOLIC BLOOD PRESSURE: 70 MMHG | WEIGHT: 130 LBS | SYSTOLIC BLOOD PRESSURE: 118 MMHG | TEMPERATURE: 98.7 F | OXYGEN SATURATION: 95 % | BODY MASS INDEX: 20.89 KG/M2

## 2020-02-22 DIAGNOSIS — J02.9 EXUDATIVE PHARYNGITIS: Primary | ICD-10-CM

## 2020-02-22 DIAGNOSIS — J02.9 SORE THROAT: ICD-10-CM

## 2020-02-22 LAB
HETEROPH AB SER QL LA: NORMAL
INT CON NEG: NEGATIVE
INT CON NEG: NEGATIVE
INT CON POS: POSITIVE
INT CON POS: POSITIVE
S PYO AG THROAT QL: NORMAL

## 2020-02-22 PROCEDURE — 99214 OFFICE O/P EST MOD 30 MIN: CPT | Performed by: PHYSICIAN ASSISTANT

## 2020-02-22 PROCEDURE — 87880 STREP A ASSAY W/OPTIC: CPT | Performed by: PHYSICIAN ASSISTANT

## 2020-02-22 PROCEDURE — 86308 HETEROPHILE ANTIBODY SCREEN: CPT | Performed by: PHYSICIAN ASSISTANT

## 2020-02-22 RX ORDER — AMOXICILLIN AND CLAVULANATE POTASSIUM 875; 125 MG/1; MG/1
1 TABLET, FILM COATED ORAL 2 TIMES DAILY
Qty: 14 TAB | Refills: 0 | Status: SHIPPED | OUTPATIENT
Start: 2020-02-22 | End: 2020-02-29

## 2020-02-22 ASSESSMENT — ENCOUNTER SYMPTOMS
NAUSEA: 0
SORE THROAT: 1
COUGH: 1
SHORTNESS OF BREATH: 0
ABDOMINAL PAIN: 0
CONSTIPATION: 0
DIARRHEA: 0
FEVER: 0
VOMITING: 0
SPUTUM PRODUCTION: 1
WHEEZING: 0
CHILLS: 0
MYALGIAS: 1

## 2020-02-23 NOTE — PROGRESS NOTES
Subjective:   Doug Arora is a 24 y.o. female who presents for Cough (mucous, sore throat, nasal congestion )        Cough   This is a new problem. Episode onset: 5 days  The problem has been unchanged. The problem occurs constantly. The cough is productive of sputum. Associated symptoms include ear congestion, myalgias, nasal congestion and a sore throat. Pertinent negatives include no chills, ear pain, fever, shortness of breath or wheezing. Risk factors: No ill contacts. Pt received flu shot. No recent travel.  Treatments tried: Sudafed, flonase  There is no history of asthma, bronchitis or pneumonia.     Review of Systems   Constitutional: Negative for chills, fever and malaise/fatigue.   HENT: Positive for congestion and sore throat. Negative for ear pain.    Respiratory: Positive for cough and sputum production. Negative for shortness of breath and wheezing.    Gastrointestinal: Negative for abdominal pain, constipation, diarrhea, nausea and vomiting.   Musculoskeletal: Positive for myalgias.   All other systems reviewed and are negative.      PMH:  has no past medical history on file.  MEDS:   Current Outpatient Medications:   •  amoxicillin-clavulanate (AUGMENTIN) 875-125 MG Tab, Take 1 Tab by mouth 2 times a day for 7 days., Disp: 14 Tab, Rfl: 0  •  SERJIO 1/20 1-20 MG-MCG per tablet, TAKE ONE TABLET BY MOUTH EVERY DAY, Disp: 21 Tab, Rfl: 11  •  escitalopram (LEXAPRO) 10 MG Tab, TAKE ONE TABLET BY MOUTH EVERY DAY, Disp: 30 Tab, Rfl: 5  •  fluticasone (FLONASE) 50 MCG/ACT nasal spray, Spray 1 Spray in nose every day., Disp: 16 g, Rfl: 0  •  benzonatate (TESSALON) 100 MG Cap, Take 1 Cap by mouth 3 times a day as needed for Cough. (Patient not taking: Reported on 2/22/2020), Disp: 30 Cap, Rfl: 0  ALLERGIES: No Known Allergies  SURGHX:   Past Surgical History:   Procedure Laterality Date   • DENTAL EXTRACTION(S)      @ 10 yrs old     SOCHX:  reports that she has never smoked. She has never used  "smokeless tobacco. She reports current alcohol use. She reports that she does not use drugs.  History reviewed. No pertinent family history.     Objective:   /70 (BP Location: Right arm, Patient Position: Sitting, BP Cuff Size: Adult)   Pulse 86   Temp 37.1 °C (98.7 °F) (Tympanic)   Resp 16   Ht 1.676 m (5' 6\")   Wt 59 kg (130 lb)   SpO2 95%   Breastfeeding No   BMI 20.98 kg/m²     Physical Exam  Vitals signs reviewed.   Constitutional:       General: She is not in acute distress.     Appearance: She is well-developed.   HENT:      Head: Normocephalic and atraumatic.      Right Ear: Tympanic membrane and external ear normal.      Left Ear: Tympanic membrane and external ear normal.      Nose: Mucosal edema and congestion present.      Mouth/Throat:      Mouth: Mucous membranes are moist.      Pharynx: Oropharynx is clear. Uvula midline.      Tonsils: Tonsillar exudate present. No tonsillar abscesses. 2+ on the right. 2+ on the left.   Eyes:      Conjunctiva/sclera: Conjunctivae normal.      Pupils: Pupils are equal, round, and reactive to light.   Neck:      Musculoskeletal: Normal range of motion and neck supple. No neck rigidity or muscular tenderness.      Vascular: No carotid bruit.      Trachea: No tracheal deviation.   Cardiovascular:      Rate and Rhythm: Normal rate and regular rhythm.   Pulmonary:      Effort: Pulmonary effort is normal.      Breath sounds: Normal breath sounds.   Lymphadenopathy:      Cervical: Cervical adenopathy present.   Skin:     General: Skin is warm and dry.      Capillary Refill: Capillary refill takes less than 2 seconds.   Neurological:      General: No focal deficit present.      Mental Status: She is alert and oriented to person, place, and time.   Psychiatric:         Mood and Affect: Mood normal.         Behavior: Behavior normal.           Assessment/Plan:     1. Exudative pharyngitis  amoxicillin-clavulanate (AUGMENTIN) 875-125 MG Tab   2. Sore throat  POCT " Rapid Strep A    POCT Mononucleosis (mono)     Supportive care reviewed. Start warm salt water gargles, alternate tylenol and motrin. Pharyngitis handout given to patient.     Follow-up with primary care provider within 7-10 days.  If symptoms worsen or persist patient can return to clinic for reevaluation. All side effects of medication discussed including allergic response, GI upset, tendon injury, etc. Patient and mother confirmed understanding of information.    Please note that this dictation was created using voice recognition software. I have made every reasonable attempt to correct obvious errors, but I expect that there are errors of grammar and possibly content that I did not discover before finalizing the note.

## 2020-02-23 NOTE — PATIENT INSTRUCTIONS
Pharyngitis  Pharyngitis is redness, pain, and swelling (inflammation) of your pharynx.  What are the causes?  Pharyngitis is usually caused by infection. Most of the time, these infections are from viruses (viral) and are part of a cold. However, sometimes pharyngitis is caused by bacteria (bacterial). Pharyngitis can also be caused by allergies. Viral pharyngitis may be spread from person to person by coughing, sneezing, and personal items or utensils (cups, forks, spoons, toothbrushes). Bacterial pharyngitis may be spread from person to person by more intimate contact, such as kissing.  What are the signs or symptoms?  Symptoms of pharyngitis include:  · Sore throat.  · Tiredness (fatigue).  · Low-grade fever.  · Headache.  · Joint pain and muscle aches.  · Skin rashes.  · Swollen lymph nodes.  · Plaque-like film on throat or tonsils (often seen with bacterial pharyngitis).  How is this diagnosed?  Your health care provider will ask you questions about your illness and your symptoms. Your medical history, along with a physical exam, is often all that is needed to diagnose pharyngitis. Sometimes, a rapid strep test is done. Other lab tests may also be done, depending on the suspected cause.  How is this treated?  Viral pharyngitis will usually get better in 3-4 days without the use of medicine. Bacterial pharyngitis is treated with medicines that kill germs (antibiotics).  Follow these instructions at home:  · Drink enough water and fluids to keep your urine clear or pale yellow.  · Only take over-the-counter or prescription medicines as directed by your health care provider:  ¨ If you are prescribed antibiotics, make sure you finish them even if you start to feel better.  ¨ Do not take aspirin.  · Get lots of rest.  · Gargle with 8 oz of salt water (½ tsp of salt per 1 qt of water) as often as every 1-2 hours to soothe your throat.  · Throat lozenges (if you are not at risk for choking) or sprays may be used to  soothe your throat.  Contact a health care provider if:  · You have large, tender lumps in your neck.  · You have a rash.  · You cough up green, yellow-brown, or bloody spit.  Get help right away if:  · Your neck becomes stiff.  · You drool or are unable to swallow liquids.  · You vomit or are unable to keep medicines or liquids down.  · You have severe pain that does not go away with the use of recommended medicines.  · You have trouble breathing (not caused by a stuffy nose).  This information is not intended to replace advice given to you by your health care provider. Make sure you discuss any questions you have with your health care provider.  Document Released: 12/18/2006 Document Revised: 05/25/2017 Document Reviewed: 08/25/2014  [employer] Interactive Patient Education © 2017 [employer]

## 2020-03-16 ENCOUNTER — OFFICE VISIT (OUTPATIENT)
Dept: MEDICAL GROUP | Facility: LAB | Age: 25
End: 2020-03-16
Payer: COMMERCIAL

## 2020-03-16 VITALS
OXYGEN SATURATION: 95 % | DIASTOLIC BLOOD PRESSURE: 70 MMHG | RESPIRATION RATE: 12 BRPM | HEIGHT: 66 IN | BODY MASS INDEX: 21.21 KG/M2 | SYSTOLIC BLOOD PRESSURE: 90 MMHG | WEIGHT: 132 LBS | TEMPERATURE: 98.8 F | HEART RATE: 76 BPM

## 2020-03-16 DIAGNOSIS — J03.91 RECURRENT TONSILLITIS: ICD-10-CM

## 2020-03-16 PROCEDURE — 99214 OFFICE O/P EST MOD 30 MIN: CPT | Performed by: NURSE PRACTITIONER

## 2020-03-16 RX ORDER — PREDNISONE 20 MG/1
TABLET ORAL
Qty: 6 TAB | Refills: 0 | Status: SHIPPED
Start: 2020-03-16 | End: 2020-04-14

## 2020-03-16 RX ORDER — CEFDINIR 300 MG/1
300 CAPSULE ORAL 2 TIMES DAILY
Qty: 14 CAP | Refills: 0 | Status: SHIPPED | OUTPATIENT
Start: 2020-03-16 | End: 2020-03-23

## 2020-03-16 NOTE — PROGRESS NOTES
CC  ST YARI Camacho is a 24 y.o established patient with complaint of 1 month of a sore throat, difficulty swallowing and enlarged tonsils -new issue to me today.  Was treated in urgent care about a month ago with Augmentin, tells me this helped but her symptoms did not completely resolve and have started to worsen over the past week.  Has felt tired but denies fevers, chills, cough or shortness of breath.  Taking ibuprofen which helps a little bit.  Eating is difficult because of the pain.  Is ready to see ENT for tonsillectomy as she had recurrent strep as a teenager and now gets episodes of tonsillitis every winter.  Non-smoker.      Past medical, surgical, family, and social history is reviewed and updated in Epic chart by me today.   Medications and allergies reviewed and updated in Epic chart by me today.     ROS:   Denies n/v/d or abdominal pain.     Exam:    Vitals:    03/16/20 1438   BP: (!) 90/70   Pulse: 76   Resp: 12   Temp: 37.1 °C (98.8 °F)   SpO2: 95%     Constitutional: Alert, no distress, plus 3 vital signs  Skin:  Warm, dry, no rashes invisible areas  Eye: Equal, round and reactive, conjunctiva clear  ENMT: Bilateral tympanic membranes are retracted but translucent without erythema or perforation. Oropharynx shows tonsillar enlargement +2 on the right, +3 on the left.  Neck: moderate / tender anterior cervical LAD.  Respiratory: Unlabored respiration, lungs clear to auscultation, no wheezes, no rhonchi  Cardiovascular: Normal rate and rhythm  Psych: Alert, pleasant, well-groomed, normal affect      A/P:  1. Recurrent tonsillitis  -Treated acutely with Omnicef 300 mg bid x 7 d and prednisone 40 mg daily for 3 days.  Discussed potential side effects of both medications.  Encouraged her to increase her fluids, rest and f/u with ENT for discussion of tonsillectomy but within a week with me if symptoms have not improved.    - REFERRAL TO ENT

## 2020-04-14 ENCOUNTER — HOSPITAL ENCOUNTER (OUTPATIENT)
Facility: MEDICAL CENTER | Age: 25
End: 2020-04-14
Attending: NURSE PRACTITIONER
Payer: COMMERCIAL

## 2020-04-14 ENCOUNTER — OFFICE VISIT (OUTPATIENT)
Dept: MEDICAL GROUP | Facility: LAB | Age: 25
End: 2020-04-14
Payer: COMMERCIAL

## 2020-04-14 VITALS
SYSTOLIC BLOOD PRESSURE: 98 MMHG | DIASTOLIC BLOOD PRESSURE: 64 MMHG | BODY MASS INDEX: 21.69 KG/M2 | WEIGHT: 135 LBS | TEMPERATURE: 98.9 F | RESPIRATION RATE: 12 BRPM | HEIGHT: 66 IN | HEART RATE: 80 BPM | OXYGEN SATURATION: 97 %

## 2020-04-14 DIAGNOSIS — Z01.419 ENCOUNTER FOR GYNECOLOGICAL EXAMINATION: ICD-10-CM

## 2020-04-14 DIAGNOSIS — Z12.4 SCREENING FOR MALIGNANT NEOPLASM OF CERVIX: ICD-10-CM

## 2020-04-14 DIAGNOSIS — N92.0 MENORRHAGIA WITH REGULAR CYCLE: ICD-10-CM

## 2020-04-14 DIAGNOSIS — F41.9 ANXIETY: ICD-10-CM

## 2020-04-14 DIAGNOSIS — Z09 NEED FOR IMMUNIZATION FOLLOW-UP: ICD-10-CM

## 2020-04-14 PROCEDURE — 99395 PREV VISIT EST AGE 18-39: CPT | Mod: 25 | Performed by: NURSE PRACTITIONER

## 2020-04-14 PROCEDURE — 87591 N.GONORRHOEAE DNA AMP PROB: CPT

## 2020-04-14 PROCEDURE — 88175 CYTOPATH C/V AUTO FLUID REDO: CPT

## 2020-04-14 PROCEDURE — 90651 9VHPV VACCINE 2/3 DOSE IM: CPT | Performed by: NURSE PRACTITIONER

## 2020-04-14 PROCEDURE — 90621 MENB-FHBP VACC 2/3 DOSE IM: CPT | Performed by: NURSE PRACTITIONER

## 2020-04-14 PROCEDURE — 87491 CHLMYD TRACH DNA AMP PROBE: CPT

## 2020-04-14 PROCEDURE — 90471 IMMUNIZATION ADMIN: CPT | Performed by: NURSE PRACTITIONER

## 2020-04-14 PROCEDURE — 90472 IMMUNIZATION ADMIN EACH ADD: CPT | Performed by: NURSE PRACTITIONER

## 2020-04-14 RX ORDER — NORETHINDRONE ACETATE AND ETHINYL ESTRADIOL .02; 1 MG/1; MG/1
1 TABLET ORAL
Qty: 63 TAB | Refills: 3 | Status: SHIPPED | OUTPATIENT
Start: 2020-04-14 | End: 2021-02-08 | Stop reason: SDUPTHER

## 2020-04-14 RX ORDER — ESCITALOPRAM OXALATE 10 MG/1
10 TABLET ORAL
Qty: 90 TAB | Refills: 3 | Status: SHIPPED | OUTPATIENT
Start: 2020-04-14 | End: 2021-05-03

## 2020-07-31 ENCOUNTER — APPOINTMENT (OUTPATIENT)
Dept: ADMISSIONS | Facility: MEDICAL CENTER | Age: 25
End: 2020-07-31
Payer: COMMERCIAL

## 2020-07-31 ENCOUNTER — OFFICE VISIT (OUTPATIENT)
Dept: ADMISSIONS | Facility: MEDICAL CENTER | Age: 25
End: 2020-07-31
Attending: OTOLARYNGOLOGY
Payer: COMMERCIAL

## 2020-07-31 DIAGNOSIS — Z01.812 PRE-OPERATIVE LABORATORY EXAMINATION: ICD-10-CM

## 2020-07-31 LAB — COVID ORDER STATUS COVID19: NORMAL

## 2020-07-31 PROCEDURE — U0003 INFECTIOUS AGENT DETECTION BY NUCLEIC ACID (DNA OR RNA); SEVERE ACUTE RESPIRATORY SYNDROME CORONAVIRUS 2 (SARS-COV-2) (CORONAVIRUS DISEASE [COVID-19]), AMPLIFIED PROBE TECHNIQUE, MAKING USE OF HIGH THROUGHPUT TECHNOLOGIES AS DESCRIBED BY CMS-2020-01-R: HCPCS

## 2020-07-31 SDOH — HEALTH STABILITY: MENTAL HEALTH: HOW OFTEN DO YOU HAVE A DRINK CONTAINING ALCOHOL?: MONTHLY OR LESS

## 2020-08-01 LAB
SARS-COV-2 RNA RESP QL NAA+PROBE: NOTDETECTED
SPECIMEN SOURCE: NORMAL

## 2020-08-03 ENCOUNTER — HOSPITAL ENCOUNTER (OUTPATIENT)
Facility: MEDICAL CENTER | Age: 25
End: 2020-08-03
Attending: OTOLARYNGOLOGY | Admitting: OTOLARYNGOLOGY
Payer: COMMERCIAL

## 2020-08-03 ENCOUNTER — ANESTHESIA (OUTPATIENT)
Dept: SURGERY | Facility: MEDICAL CENTER | Age: 25
End: 2020-08-03
Payer: COMMERCIAL

## 2020-08-03 ENCOUNTER — ANESTHESIA EVENT (OUTPATIENT)
Dept: SURGERY | Facility: MEDICAL CENTER | Age: 25
End: 2020-08-03
Payer: COMMERCIAL

## 2020-08-03 VITALS
HEIGHT: 66 IN | DIASTOLIC BLOOD PRESSURE: 65 MMHG | HEART RATE: 94 BPM | OXYGEN SATURATION: 96 % | TEMPERATURE: 97.8 F | WEIGHT: 151.46 LBS | SYSTOLIC BLOOD PRESSURE: 104 MMHG | RESPIRATION RATE: 16 BRPM | BODY MASS INDEX: 24.34 KG/M2

## 2020-08-03 DIAGNOSIS — G89.18 ACUTE POST-OPERATIVE PAIN: ICD-10-CM

## 2020-08-03 LAB
HCG UR QL: NEGATIVE
PATHOLOGY CONSULT NOTE: NORMAL

## 2020-08-03 PROCEDURE — 700101 HCHG RX REV CODE 250: Performed by: ANESTHESIOLOGY

## 2020-08-03 PROCEDURE — 160009 HCHG ANES TIME/MIN: Performed by: OTOLARYNGOLOGY

## 2020-08-03 PROCEDURE — 500257: Performed by: OTOLARYNGOLOGY

## 2020-08-03 PROCEDURE — 88304 TISSUE EXAM BY PATHOLOGIST: CPT

## 2020-08-03 PROCEDURE — 160036 HCHG PACU - EA ADDL 30 MINS PHASE I: Performed by: OTOLARYNGOLOGY

## 2020-08-03 PROCEDURE — 160025 RECOVERY II MINUTES (STATS): Performed by: OTOLARYNGOLOGY

## 2020-08-03 PROCEDURE — 160027 HCHG SURGERY MINUTES - 1ST 30 MINS LEVEL 2: Performed by: OTOLARYNGOLOGY

## 2020-08-03 PROCEDURE — 700105 HCHG RX REV CODE 258: Performed by: OTOLARYNGOLOGY

## 2020-08-03 PROCEDURE — 81025 URINE PREGNANCY TEST: CPT

## 2020-08-03 PROCEDURE — 160038 HCHG SURGERY MINUTES - EA ADDL 1 MIN LEVEL 2: Performed by: OTOLARYNGOLOGY

## 2020-08-03 PROCEDURE — 501424 HCHG SPONGE, TONSIL: Performed by: OTOLARYNGOLOGY

## 2020-08-03 PROCEDURE — 160002 HCHG RECOVERY MINUTES (STAT): Performed by: OTOLARYNGOLOGY

## 2020-08-03 PROCEDURE — 700102 HCHG RX REV CODE 250 W/ 637 OVERRIDE(OP): Performed by: ANESTHESIOLOGY

## 2020-08-03 PROCEDURE — 160048 HCHG OR STATISTICAL LEVEL 1-5: Performed by: OTOLARYNGOLOGY

## 2020-08-03 PROCEDURE — A9270 NON-COVERED ITEM OR SERVICE: HCPCS

## 2020-08-03 PROCEDURE — 700111 HCHG RX REV CODE 636 W/ 250 OVERRIDE (IP): Performed by: ANESTHESIOLOGY

## 2020-08-03 PROCEDURE — 502573 HCHG PACK, ENT: Performed by: OTOLARYNGOLOGY

## 2020-08-03 PROCEDURE — 700102 HCHG RX REV CODE 250 W/ 637 OVERRIDE(OP)

## 2020-08-03 PROCEDURE — A9270 NON-COVERED ITEM OR SERVICE: HCPCS | Performed by: ANESTHESIOLOGY

## 2020-08-03 PROCEDURE — 160046 HCHG PACU - 1ST 60 MINS PHASE II: Performed by: OTOLARYNGOLOGY

## 2020-08-03 PROCEDURE — 160047 HCHG PACU  - EA ADDL 30 MINS PHASE II: Performed by: OTOLARYNGOLOGY

## 2020-08-03 PROCEDURE — 160035 HCHG PACU - 1ST 60 MINS PHASE I: Performed by: OTOLARYNGOLOGY

## 2020-08-03 RX ORDER — CELECOXIB 200 MG/1
400 CAPSULE ORAL ONCE
Status: COMPLETED | OUTPATIENT
Start: 2020-08-03 | End: 2020-08-03

## 2020-08-03 RX ORDER — AMOXICILLIN 400 MG/5ML
800 POWDER, FOR SUSPENSION ORAL 2 TIMES DAILY
Qty: 200 ML | Refills: 0 | Status: SHIPPED | OUTPATIENT
Start: 2020-08-03 | End: 2020-08-13

## 2020-08-03 RX ORDER — HYDROCODONE BITARTRATE AND ACETAMINOPHEN 7.5; 325 MG/1; MG/1
.5-1 TABLET ORAL EVERY 4 HOURS PRN
Qty: 42 TAB | Refills: 0 | Status: SHIPPED | OUTPATIENT
Start: 2020-08-03 | End: 2020-08-10

## 2020-08-03 RX ORDER — OXYCODONE HCL 5 MG/5 ML
5 SOLUTION, ORAL ORAL
Status: CANCELLED | OUTPATIENT
Start: 2020-08-03

## 2020-08-03 RX ORDER — SODIUM CHLORIDE, SODIUM LACTATE, POTASSIUM CHLORIDE, CALCIUM CHLORIDE 600; 310; 30; 20 MG/100ML; MG/100ML; MG/100ML; MG/100ML
INJECTION, SOLUTION INTRAVENOUS CONTINUOUS
Status: DISCONTINUED | OUTPATIENT
Start: 2020-08-03 | End: 2020-08-03 | Stop reason: HOSPADM

## 2020-08-03 RX ORDER — HYDROMORPHONE HYDROCHLORIDE 2 MG/1
2 TABLET ORAL
Status: COMPLETED | OUTPATIENT
Start: 2020-08-03 | End: 2020-08-03

## 2020-08-03 RX ORDER — MIDAZOLAM HYDROCHLORIDE 1 MG/ML
1 INJECTION INTRAMUSCULAR; INTRAVENOUS
Status: DISCONTINUED | OUTPATIENT
Start: 2020-08-03 | End: 2020-08-03 | Stop reason: HOSPADM

## 2020-08-03 RX ORDER — SCOLOPAMINE TRANSDERMAL SYSTEM 1 MG/1
1 PATCH, EXTENDED RELEASE TRANSDERMAL
Status: DISCONTINUED | OUTPATIENT
Start: 2020-08-03 | End: 2020-08-03 | Stop reason: HOSPADM

## 2020-08-03 RX ORDER — ACETAMINOPHEN 500 MG
1000 TABLET ORAL ONCE
Status: COMPLETED | OUTPATIENT
Start: 2020-08-03 | End: 2020-08-03

## 2020-08-03 RX ORDER — DEXAMETHASONE SODIUM PHOSPHATE 4 MG/ML
INJECTION, SOLUTION INTRA-ARTICULAR; INTRALESIONAL; INTRAMUSCULAR; INTRAVENOUS; SOFT TISSUE PRN
Status: DISCONTINUED | OUTPATIENT
Start: 2020-08-03 | End: 2020-08-03 | Stop reason: SURG

## 2020-08-03 RX ORDER — MIDAZOLAM HYDROCHLORIDE 1 MG/ML
INJECTION INTRAMUSCULAR; INTRAVENOUS PRN
Status: DISCONTINUED | OUTPATIENT
Start: 2020-08-03 | End: 2020-08-03 | Stop reason: SURG

## 2020-08-03 RX ORDER — ONDANSETRON 2 MG/ML
INJECTION INTRAMUSCULAR; INTRAVENOUS PRN
Status: DISCONTINUED | OUTPATIENT
Start: 2020-08-03 | End: 2020-08-03 | Stop reason: SURG

## 2020-08-03 RX ORDER — OXYCODONE HCL 5 MG/5 ML
10 SOLUTION, ORAL ORAL
Status: CANCELLED | OUTPATIENT
Start: 2020-08-03

## 2020-08-03 RX ORDER — LIDOCAINE HYDROCHLORIDE 20 MG/ML
INJECTION, SOLUTION EPIDURAL; INFILTRATION; INTRACAUDAL; PERINEURAL PRN
Status: DISCONTINUED | OUTPATIENT
Start: 2020-08-03 | End: 2020-08-03 | Stop reason: SURG

## 2020-08-03 RX ORDER — GABAPENTIN 300 MG/1
300 CAPSULE ORAL ONCE
Status: COMPLETED | OUTPATIENT
Start: 2020-08-03 | End: 2020-08-03

## 2020-08-03 RX ORDER — HYDROMORPHONE HYDROCHLORIDE 2 MG/1
TABLET ORAL
Status: COMPLETED
Start: 2020-08-03 | End: 2020-08-03

## 2020-08-03 RX ORDER — HALOPERIDOL 5 MG/ML
1 INJECTION INTRAMUSCULAR
Status: DISCONTINUED | OUTPATIENT
Start: 2020-08-03 | End: 2020-08-03 | Stop reason: HOSPADM

## 2020-08-03 RX ORDER — SUCCINYLCHOLINE/SOD CL,ISO/PF 200MG/10ML
SYRINGE (ML) INTRAVENOUS PRN
Status: DISCONTINUED | OUTPATIENT
Start: 2020-08-03 | End: 2020-08-03 | Stop reason: SURG

## 2020-08-03 RX ORDER — MAGNESIUM SULFATE HEPTAHYDRATE 40 MG/ML
INJECTION, SOLUTION INTRAVENOUS PRN
Status: DISCONTINUED | OUTPATIENT
Start: 2020-08-03 | End: 2020-08-03 | Stop reason: SURG

## 2020-08-03 RX ORDER — ROCURONIUM BROMIDE 10 MG/ML
INJECTION, SOLUTION INTRAVENOUS PRN
Status: DISCONTINUED | OUTPATIENT
Start: 2020-08-03 | End: 2020-08-03 | Stop reason: SURG

## 2020-08-03 RX ORDER — OXYCODONE HCL 10 MG/1
10 TABLET, FILM COATED, EXTENDED RELEASE ORAL ONCE
Status: DISCONTINUED | OUTPATIENT
Start: 2020-08-03 | End: 2020-08-03 | Stop reason: HOSPADM

## 2020-08-03 RX ADMIN — DEXAMETHASONE SODIUM PHOSPHATE 12 MG: 4 INJECTION, SOLUTION INTRA-ARTICULAR; INTRALESIONAL; INTRAMUSCULAR; INTRAVENOUS; SOFT TISSUE at 09:59

## 2020-08-03 RX ADMIN — ACETAMINOPHEN 1000 MG: 500 TABLET ORAL at 09:43

## 2020-08-03 RX ADMIN — GABAPENTIN 300 MG: 300 CAPSULE ORAL at 09:34

## 2020-08-03 RX ADMIN — LIDOCAINE HYDROCHLORIDE 5 ML: 20 INJECTION, SOLUTION EPIDURAL; INFILTRATION; INTRACAUDAL at 09:59

## 2020-08-03 RX ADMIN — HYDROMORPHONE HYDROCHLORIDE 2 MG: 2 TABLET ORAL at 11:05

## 2020-08-03 RX ADMIN — MAGNESIUM SULFATE IN WATER 4 G: 40 INJECTION, SOLUTION INTRAVENOUS at 10:05

## 2020-08-03 RX ADMIN — ONDANSETRON 4 MG: 2 INJECTION INTRAMUSCULAR; INTRAVENOUS at 09:59

## 2020-08-03 RX ADMIN — SCOLOPAMINE TRANSDERMAL SYSTEM 1 PATCH: 1 PATCH, EXTENDED RELEASE TRANSDERMAL at 09:34

## 2020-08-03 RX ADMIN — PROPOFOL 20 MG: 10 INJECTION, EMULSION INTRAVENOUS at 10:05

## 2020-08-03 RX ADMIN — ROCURONIUM BROMIDE 10 MG: 10 INJECTION, SOLUTION INTRAVENOUS at 09:59

## 2020-08-03 RX ADMIN — FENTANYL CITRATE 100 MCG: 50 INJECTION INTRAMUSCULAR; INTRAVENOUS at 09:59

## 2020-08-03 RX ADMIN — PROPOFOL 100 MG: 10 INJECTION, EMULSION INTRAVENOUS at 09:59

## 2020-08-03 RX ADMIN — CELECOXIB 400 MG: 200 CAPSULE ORAL at 09:34

## 2020-08-03 RX ADMIN — FENTANYL CITRATE 50 MCG: 50 INJECTION INTRAMUSCULAR; INTRAVENOUS at 10:39

## 2020-08-03 RX ADMIN — MIDAZOLAM HYDROCHLORIDE 2 MG: 1 INJECTION, SOLUTION INTRAMUSCULAR; INTRAVENOUS at 09:59

## 2020-08-03 RX ADMIN — SODIUM CHLORIDE, POTASSIUM CHLORIDE, SODIUM LACTATE AND CALCIUM CHLORIDE: 600; 310; 30; 20 INJECTION, SOLUTION INTRAVENOUS at 09:42

## 2020-08-03 RX ADMIN — FENTANYL CITRATE 50 MCG: 50 INJECTION INTRAMUSCULAR; INTRAVENOUS at 10:46

## 2020-08-03 RX ADMIN — Medication 100 MG: at 09:59

## 2020-08-03 ASSESSMENT — PAIN SCALES - GENERAL: PAIN_LEVEL: 3

## 2020-08-03 NOTE — OR SURGEON
Immediate Post OP Note    PreOp Diagnosis: Chronic Tonsillitis, Tonsil hypertrophy, Snoring    PostOp Diagnosis: same    Procedure(s):  TONSILLECTOMY AND ADENOIDECTOMY - Wound Class: Clean Contaminated    Surgeon(s):  José Miguel Encarnacion M.D.    Anesthesiologist/Type of Anesthesia:  Anesthesiologist: Wayne Townsend M.D./General    Surgical Staff:  Circulator: Aletha Whipple R.N.  Scrub Person: Imelda Gonzalez; Jie Busby    Specimens removed if any:  ID Type Source Tests Collected by Time Destination   A : Right tonsil Tissue Tonsil PATHOLOGY SPECIMEN José Miguel Encarnacion M.D. 8/3/2020 1013    B : Left tonsil Tissue Tonsil PATHOLOGY SPECIMEN José Miguel Encarnacion M.D. 8/3/2020 1013        Estimated Blood Loss: 100 cc    Findings: 3+ tonsils, remnant adenoid.     Complications: none        8/3/2020 11:05 AM José Miguel Encarnacion M.D.

## 2020-08-03 NOTE — ANESTHESIA TIME REPORT
Anesthesia Start and Stop Event Times     Date Time Event    8/3/2020 0920 Ready for Procedure     0950 Anesthesia Start     1051 Anesthesia Stop        Responsible Staff  08/03/20    Name Role Begin End    Wayne Townsend M.D. Anesth 0950 1051        Preop Diagnosis (Free Text):  Pre-op Diagnosis     , J351,         Preop Diagnosis (Codes):    Post op Diagnosis  Chronic tonsillitis  Hypertrophy    Premium Reason  Non-Premium    Comments:

## 2020-08-03 NOTE — OR NURSING
1112 Pt care assumed from Jie BETANCOURT, report taken. Pt awake, sore throat pain 3/10, resting, respirations even and unlabored.     1128 VSS, tolerating sips of water, drowsy but easily arousable    1200 Resting, VSS    1240 Pain improved to 2/10, up to BR, able to void    1251 Pt dressed and up to recliner    1330 VSS, pain well controlled, no c/o nausea, Father called and updated    1420 Pt wheeled out to waiting room and patient and father given DC instructions regarding meds, follow up, activities, when to call MD. Pt and father verbalized understanding of instructions. Pt then became nauseous with aprox 150ML clear liquid emesis    1430 Dr Encarnacion called and updated, new orders received to call in antiemetic to pt's outpatient pharmacy    1440 Pt feeling better, DC'd with FAther via RN WC escort out, anti emetic called to pt's pharmacy

## 2020-08-03 NOTE — ANESTHESIA POSTPROCEDURE EVALUATION
Patient: Doug Arora    Procedure Summary     Date:  08/03/20 Room / Location:  [facility] ROOM 23 / SURGERY SAME DAY [facility]    Anesthesia Start:  0950 Anesthesia Stop:  1051    Procedure:  TONSILLECTOMY AND ADENOIDECTOMY (Bilateral Throat) Diagnosis:  (, J351, )    Surgeon:  José Miguel Encranacion M.D. Responsible Provider:  Wayne Townsend M.D.    Anesthesia Type:  general ASA Status:  1          Final Anesthesia Type: general  Last vitals  BP   Blood Pressure: 106/65    Temp   36.6 °C (97.8 °F)    Pulse   Pulse: (!) 103   Resp   16    SpO2   98 %      Anesthesia Post Evaluation    Patient location during evaluation: PACU  Patient participation: complete - patient participated  Level of consciousness: awake and alert  Pain score: 3    Airway patency: patent  Anesthetic complications: no  Cardiovascular status: hemodynamically stable  Respiratory status: acceptable  Hydration status: euvolemic    PONV: none           Nurse Pain Score: 3 (NPRS)

## 2020-08-03 NOTE — DISCHARGE INSTRUCTIONS
ACTIVITY: Rest and take it easy for the first 24 hours.  A responsible adult is recommended to remain with you during that time.  It is normal to feel sleepy.  We encourage you to not do anything that requires balance, judgment or coordination.    MILD FLU-LIKE SYMPTOMS ARE NORMAL. YOU MAY EXPERIENCE GENERALIZED MUSCLE ACHES, THROAT IRRITATION, HEADACHE AND/OR SOME NAUSEA.    FOR 24 HOURS DO NOT:  Drive, operate machinery or run household appliances.  Drink beer or alcoholic beverages.   Make important decisions or sign legal documents.    SPECIAL INSTRUCTIONS: see attached post op instruction sheet from Dr. Encarnacion for tonsillectomy and adenoidectomy    Diet-Liquids and soft foods.  Avoid citrus and salty foods, and hot foods, both spicy and hot from a temperature standpoint.  No strenuous activity.  Avoid excessively hot showers or baths.  If bleeding occurs try gargling with ice cold water for 10 minutes.  If it doesn't stop after 10 minutes please call the office number day or night: [phone]. Follow up Tuesday 8/18 at 8:45 am.     DIET: To avoid nausea, slowly advance diet as tolerated, avoiding spicy or greasy foods for the first day.  Add more substantial food to your diet according to your physician's instructions.  Babies can be fed formula or breast milk as soon as they are hungry.  INCREASE FLUIDS AND FIBER TO AVOID CONSTIPATION.    SURGICAL DRESSING/BATHING: avoid long, hot, steamy showers    FOLLOW-UP APPOINTMENT:  A follow-up appointment should be arranged with your doctor for Tuesday 8/18 at 8:45 am; call to schedule if not already scheduled.    You should CALL YOUR PHYSICIAN if you develop:  Fever greater than 101 degrees F.  Pain not relieved by medication, or persistent nausea or vomiting.  Excessive bleeding (blood soaking through dressing) or unexpected drainage from the wound.  Extreme redness or swelling around the incision site, drainage of pus or foul smelling drainage.  Inability to  urinate or empty your bladder within 8 hours.  Problems with breathing or chest pain.    You should call 911 if you develop problems with breathing or chest pain.  If you are unable to contact your doctor or surgical center, you should go to the nearest emergency room or urgent care center.  Physician's telephone #: Dr José Miguel Encarnacion [phone]     If any questions arise, call your doctor.  If your doctor is not available, please feel free to call the Surgical Center at [phone].  The Center is open Monday through Friday from 7AM to 7PM.  You can also call the HEALTH HOTLINE open 24 hours/day, 7 days/week and speak to a nurse at [phone], or toll free at [phone].    A registered nurse may call you a few days after your surgery to see how you are doing after your procedure.    MEDICATIONS: Resume taking daily medication.  Take prescribed pain medication with food.  If no medication is prescribed, you may take non-aspirin pain medication if needed.  PAIN MEDICATION CAN BE VERY CONSTIPATING.  Take a stool softener or laxative such as senokot, pericolace, or milk of magnesia if needed.    Prescription given for amoxicillin and norco.  Last pain medication given at [facility] at 11:05am.    If your physician has prescribed pain medication that includes Acetaminophen (Tylenol), do not take additional Acetaminophen (Tylenol) while taking the prescribed medication.    Depression / Suicide Risk    As you are discharged from this [facility] facility, it is important to learn how to keep safe from harming yourself.    Recognize the warning signs:  · Abrupt changes in personality, positive or negative- including increase in energy   · Giving away possessions  · Change in eating patterns- significant weight changes-  positive or negative  · Change in sleeping patterns- unable to sleep or sleeping all the time   · Unwillingness or inability to communicate  · Depression  · Unusual sadness, discouragement and  loneliness  · Talk of wanting to die  · Neglect of personal appearance   · Rebelliousness- reckless behavior  · Withdrawal from people/activities they love  · Confusion- inability to concentrate     If you or a loved one observes any of these behaviors or has concerns about self-harm, here's what you can do:  · Talk about it- your feelings and reasons for harming yourself  · Remove any means that you might use to hurt yourself (examples: pills, rope, extension cords, firearm)  · Get professional help from the community (Mental Health, Substance Abuse, psychological counseling)  · Do not be alone:Call your Safe Contact- someone whom you trust who will be there for you.  · Call your local CRISIS HOTLINE 608-7665 or 702-753-5135  · Call your local Children's Mobile Crisis Response Team [place] (776) 356-3610 or www.Homuork  · Call the toll free National Suicide Prevention Hotlines   · National Suicide Prevention Lifeline 509-540-JCIU (6019)  · National Hope Line Network 800-SUICIDE (884-5030)

## 2020-08-03 NOTE — ANESTHESIA PROCEDURE NOTES
Airway    Date/Time: 8/3/2020 10:00 AM  Performed by: Wayne Townsend M.D.  Authorized by: Wayne Townsend M.D.     Location:  OR  Urgency:  Elective  Indications for Airway Management:  Anesthesia      Spontaneous Ventilation: absent    Sedation Level:  Deep  Preoxygenated: Yes    Patient Position:  Sniffing  Mask Difficulty Assessment:  0 - not attempted  Final Airway Type:  Endotracheal airway  Final Endotracheal Airway:  ETT and JUN tube  Cuffed: Yes    Technique Used for Successful ETT Placement:  Direct laryngoscopy    Insertion Site:  Oral  Blade Type:  Nancy  Laryngoscope Blade/Videolaryngoscope Blade Size:  3  ETT Size (mm):  6.5  Measured from:  Lips  Placement Verified by: auscultation and capnometry    Cormack-Lehane Classification:  Grade I - full view of glottis  Number of Attempts at Approach:  1

## 2020-08-03 NOTE — ANESTHESIA PREPROCEDURE EVALUATION
Tonsil hypertrophy    Relevant Problems   No relevant active problems       Physical Exam    Airway   Mallampati: II  TM distance: >3 FB  Neck ROM: full       Cardiovascular - normal exam  Rhythm: regular  Rate: normal  (-) murmur     Dental - normal exam           Pulmonary - normal exam  Breath sounds clear to auscultation     Abdominal    Neurological - normal exam                 Anesthesia Plan    ASA 1       Plan - general             Induction: intravenous    Postoperative Plan: Postoperative administration of opioids is intended.    Pertinent diagnostic labs and testing reviewed    Informed Consent:    Anesthetic plan and risks discussed with patient.

## 2020-08-03 NOTE — OR NURSING
1049 Received pt from OR, received report from OR RN and anesthesiologist. Pt sleeping, VSS.     1108 Pt medicated with dilaudid for pain to throat 6/10. Report to Andrei RN, pt.'s Dad to be called and updated on plan of care.

## 2020-08-05 NOTE — OP REPORT
DATE OF SERVICE:  08/03/2020    PREOPERATIVE DIAGNOSES:  1.  Chronic tonsillitis.  2.  Tonsillar hypertrophy.  3.  Snoring.    POSTOPERATIVE DIAGNOSES:  1.  Chronic tonsillitis.  2.  Tonsillar hypertrophy.  3.  Snoring.    PROCEDURE:  Tonsillectomy and adenoidectomy.    SURGEON:  José Miguel Encarnacion MD    ANESTHESIOLOGIST:  Wayne Townsend MD    INDICATIONS:  The patient is a 24-year-old female who has had a history of   recurring sore throats with 3-4 in the past year.  Her last was in February,   and since then, she has been a loud snorer.  Physical examination revealed   bulky 2+ enlarged tonsils.  The patient now presents for tonsillectomy and   adenoidectomy.    PROCEDURE:  The patient was taken to the operating room and placed in the   supine position.  General anesthesia was induced and the patient was easily   intubated.  The table was rotated 90 degrees and the patient draped in the   usual fashion for this type of procedure.  A ring mouth gag was inserted and   used to open the oral cavity.  The posterior hard palate was palpated and   found to be intact with no evidence of submucous cleft.  A red rubber catheter   was placed through the patient's right naris and used to retract the soft   palate.  The right tonsil was removed in the usual fashion, i.e.,   extracapsular dissection with a Coblator.  There was moderate bleeding from   the superior pillar, which was controlled with the Coblator.  Dissection   proceeded extracapsularly and the tonsil was removed.  It was 3+ enlarged and   very bulky in between the pillars.  The procedure was repeated on the opposite   side.  On this side, there was bleeding as well from the superior pillar more   than on the opposite  side.  This ultimately had to be packed with a sponge   while the tonsil was removed inferior to the bleeding site.  The tonsil was   dissected extracapsularly and removed.  Hemostasis was obtained with a   Coblator and with suction cautery.   Suction cautery was also used on the right   side.  The adenoid was examined and found to be 1+ enlarged.  Its remnant was   cauterized with a suction cautery.  The nasopharynx and oral cavity were then   vigorously irrigated and the irrigant suctioned.  The patient was awakened   and taken to the recovery room in stable condition.  She tolerated the   procedure well and there were no complications.       ____________________________________     MD ASAF GRANADO / TERRA    DD:  08/05/2020 10:57:39  DT:  08/05/2020 11:47:31    D#:  [id number]  Job#:  [id number]    cc: ERNST CARRASCO

## 2020-11-10 ENCOUNTER — TELEMEDICINE (OUTPATIENT)
Dept: MEDICAL GROUP | Facility: LAB | Age: 25
End: 2020-11-10
Payer: COMMERCIAL

## 2020-11-10 DIAGNOSIS — F33.42 RECURRENT MAJOR DEPRESSIVE DISORDER, IN FULL REMISSION (HCC): ICD-10-CM

## 2020-11-10 PROCEDURE — 99213 OFFICE O/P EST LOW 20 MIN: CPT | Mod: 95,CR | Performed by: NURSE PRACTITIONER

## 2020-11-10 NOTE — LETTER
November 10, 2020        Doug Arora  [address] [place] [address]        To whom it may concern:    Jennies dog Peri is her emotional support animal. Peri is essential to Jm's mental health and needs to live with her.      If you have any questions or concerns, please don't hesitate to call.        Sincerely,        KIRAN Love.    Electronically Signed

## 2020-11-10 NOTE — PROGRESS NOTES
"Telemedicine: Established Patient   This evaluation was conducted via Zoom using secure and encrypted videoconferencing technology. The patient was in a private location in the state of [place].    The patient's identity was confirmed and verbal consent was obtained for this virtual visit.    Subjective:   CC:   JABARI letter    Doug Arora is a 24 y.o. female presenting for evaluation and management of:    Depression / anxiety:  Chronic issues.  Requesting JABARI letter for her dog, varun [place].  Her dog is a tremendous help in reducing her depression.  Her dog motivates her to get up, play with her, exercise and feed her.  She feels that her dog is always there for her when she is sad.  Dogs name is Peri.  Getting ready to move into an apt.   Still taking lexapro 10 mg daily.   States depression is \"pretty controlled,\" but swings a little bit depending on what is going on.    Denies SI / HI.      ROS  Negative except for HPI    No Known Allergies    Current medicines (including changes today)  Current Outpatient Medications   Medication Sig Dispense Refill   • multivitamin (THERAGRAN) Tab Take 1 Tab by mouth every day.     • escitalopram (LEXAPRO) 10 MG Tab Take 1 Tab by mouth every day. 90 Tab 3   • norethindrone-ethinyl estradiol (SERJIO 1/20) 1-20 MG-MCG per tablet Take 1 Tab by mouth every day. 63 Tab 3     No current facility-administered medications for this visit.        Patient Active Problem List    Diagnosis Date Noted   • Chronic bilateral low back pain without sciatica 02/12/2020   • Menorrhagia with regular cycle 03/07/2018       No family history on file.    She  has a past medical history of Anesthesia, Pain, PONV (postoperative nausea and vomiting), and Psychiatric problem.  She  has a past surgical history that includes dental extraction(s) and tonsillectomy and adenoidectomy (Bilateral, 8/3/2020).       Objective:   There were no vitals taken for this visit.    Physical Exam  Gen: NAD, CRYSTAL, " WN, pleasant, cooperative, non-labored breathing.  Alert.    Assessment and Plan:   The following treatment plan was discussed:   1. Recurrent major depressive disorder, in full remission (HCC)     continue lexapro.  Moods are stable.  Agree - depression improved / stabilized with pt's dog as emotional support animal, dariusz as it helps her exercise / motivate.  JABARI letter written.         Follow-up: prn, at least yearly

## 2021-02-08 DIAGNOSIS — N92.0 MENORRHAGIA WITH REGULAR CYCLE: ICD-10-CM

## 2021-02-08 RX ORDER — NORETHINDRONE ACETATE AND ETHINYL ESTRADIOL .02; 1 MG/1; MG/1
1 TABLET ORAL
Qty: 63 TAB | Refills: 2 | Status: SHIPPED | OUTPATIENT
Start: 2021-02-08 | End: 2021-10-18

## 2021-02-08 NOTE — TELEPHONE ENCOUNTER
----- Message from Doug Arora sent at 2/7/2021 11:20 PM PST -----  Regarding: Prescription Question  Contact: [phone]  Hi,    I'm out of refills for my birth control. Can I get more? My pharmacy is [address] I need it as soon as possible.     Thank you

## 2021-02-08 NOTE — TELEPHONE ENCOUNTER
Received request via: Patient    Was the patient seen in the last year in this department? Yes  4/14/20  Does the patient have an active prescription (recently filled or refills available) for medication(s) requested? No

## 2021-05-01 DIAGNOSIS — F41.9 ANXIETY: ICD-10-CM

## 2021-05-03 RX ORDER — ESCITALOPRAM OXALATE 10 MG/1
TABLET ORAL
Qty: 90 TABLET | Refills: 3 | Status: SHIPPED | OUTPATIENT
Start: 2021-05-03 | End: 2023-05-10

## 2021-05-03 NOTE — TELEPHONE ENCOUNTER
Received request via: Pharmacy    Was the patient seen in the last year in this department? Yes  11/10/20  Does the patient have an active prescription (recently filled or refills available) for medication(s) requested? No

## 2021-09-23 ENCOUNTER — OFFICE VISIT (OUTPATIENT)
Dept: MEDICAL GROUP | Facility: LAB | Age: 26
End: 2021-09-23
Payer: COMMERCIAL

## 2021-09-23 ENCOUNTER — HOSPITAL ENCOUNTER (OUTPATIENT)
Facility: MEDICAL CENTER | Age: 26
End: 2021-09-23
Attending: NURSE PRACTITIONER
Payer: COMMERCIAL

## 2021-09-23 VITALS
HEIGHT: 66 IN | OXYGEN SATURATION: 96 % | BODY MASS INDEX: 26.84 KG/M2 | RESPIRATION RATE: 12 BRPM | HEART RATE: 70 BPM | SYSTOLIC BLOOD PRESSURE: 100 MMHG | DIASTOLIC BLOOD PRESSURE: 70 MMHG | TEMPERATURE: 97.6 F | WEIGHT: 167 LBS

## 2021-09-23 DIAGNOSIS — N93.0 BLEEDING AFTER INTERCOURSE: ICD-10-CM

## 2021-09-23 DIAGNOSIS — R10.2 PELVIC PAIN: ICD-10-CM

## 2021-09-23 DIAGNOSIS — B35.9 TINEA: ICD-10-CM

## 2021-09-23 DIAGNOSIS — Z11.3 SCREEN FOR STD (SEXUALLY TRANSMITTED DISEASE): ICD-10-CM

## 2021-09-23 PROCEDURE — 87660 TRICHOMONAS VAGIN DIR PROBE: CPT

## 2021-09-23 PROCEDURE — 99214 OFFICE O/P EST MOD 30 MIN: CPT | Performed by: NURSE PRACTITIONER

## 2021-09-23 PROCEDURE — 87480 CANDIDA DNA DIR PROBE: CPT

## 2021-09-23 PROCEDURE — 87491 CHLMYD TRACH DNA AMP PROBE: CPT

## 2021-09-23 PROCEDURE — 87591 N.GONORRHOEAE DNA AMP PROB: CPT

## 2021-09-23 PROCEDURE — 87510 GARDNER VAG DNA DIR PROBE: CPT

## 2021-09-23 RX ORDER — KETOCONAZOLE 20 MG/G
1 CREAM TOPICAL DAILY
Qty: 30 G | Refills: 1 | Status: SHIPPED | OUTPATIENT
Start: 2021-09-23 | End: 2023-05-10

## 2021-09-24 NOTE — PROGRESS NOTES
"Chief Complaint   Patient presents with   • Abdominal Pain     and bleeding after sex   • Tinea       HPI  Doug is a 26 yo est female here with c/o new onset bleeding after intercourse and periodic dyspareunia- present x 2-3 weeks and intermittent.  Has not been sexually active in 4 days as she was fearful that the pain would occur.  The pain will typically last for 1 to 4 hours after intercourse.  Same partner x 2 years.  Denies pelvic pain on days in which she is not sexually active.  Denies dysuria, urinary hesitancy or urgency. Denies fever / chills.    No other associated symptoms.  On loestrin  and states that she is very [employer] about taking her pill, and frequently forgets to take it.     Possible ring worm:  tx OTC with first occurrence 2 mo - x 2 weeks and seemed to resolve but popped back up 1 week ago.  Contracted from her cat.  + itching.      Past medical, surgical, family, and social history is reviewed and updated in Epic chart by me today.   Medications and allergies reviewed and updated in Epic chart by me today.     ROS:   As documented in history of present illness above    Exam:  /70 (BP Location: Left arm, Patient Position: Sitting, BP Cuff Size: Adult)   Pulse 70   Temp 36.4 °C (97.6 °F)   Resp 12   Ht 1.676 m (5' 6\")   Wt 75.8 kg (167 lb)   SpO2 96%   Constitutional: Alert, no distress, plus 3 vital signs  Skin:  Warm, dry.  She has a 1 x 1 cm round, erythematous scaling lesion to her left proximal thigh with centralized clearing.  Respiratory: Unlabored respiration.  GYN exam: She has suprapubic and left adnexal tenderness on pelvic exam.  No right adnexal tenderness.  No cervical motion tenderness.  She does have a thin, white vaginal discharge in the vaginal vault.  No other abnormalities on exam.  Psych: Alert, pleasant, well-groomed, normal affect    Assessment / Plan / Medical Decision makin. Pelvic pain  -Recommend all testing as below.  May cancel ultrasound " if pain resolves.  Discussed possibility of ovarian cyst, uterine fibroid.  Encouraged her to go the emergency department if pain dramatically worsens or she soaks more than 1 tampon per hour.  - Chlamydia/GC PCR Urine Or Swab; Future  - VAGINAL PATHOGENS DNA PANEL; Future  - US-PELVIC COMPLETE (TRANSABDOMINAL/TRANSVAGINAL) (COMBO); Future    2. Bleeding after intercourse  - US-PELVIC COMPLETE (TRANSABDOMINAL/TRANSVAGINAL) (COMBO); Future    3. Screen for STD (sexually transmitted disease)  - Chlamydia/GC PCR Urine Or Swab; Future  - VAGINAL PATHOGENS DNA PANEL; Future    4. Tinea  -Discussed contagiousness precautions.  Failed over-the-counter antifungal cream which was used for 2 weeks straight.  Trial of ketoconazole for 2 weeks, continuing if not completely resolved.  Follow-up if persistent.  - ketoconazole (NIZORAL) 2 % Cream; Apply 1 Application topically every day.  Dispense: 30 g; Refill: 1

## 2021-09-25 DIAGNOSIS — Z11.3 SCREEN FOR STD (SEXUALLY TRANSMITTED DISEASE): ICD-10-CM

## 2021-09-25 DIAGNOSIS — R10.2 PELVIC PAIN: ICD-10-CM

## 2021-09-26 LAB
C TRACH DNA SPEC QL NAA+PROBE: NEGATIVE
N GONORRHOEA DNA SPEC QL NAA+PROBE: NEGATIVE
SPECIMEN SOURCE: NORMAL

## 2021-10-17 DIAGNOSIS — N92.0 MENORRHAGIA WITH REGULAR CYCLE: ICD-10-CM

## 2021-10-18 RX ORDER — NORETHINDRONE ACETATE/ETHINYL ESTRADIOL 1MG-20MCG
KIT ORAL
Qty: 63 TABLET | Refills: 2 | Status: SHIPPED | OUTPATIENT
Start: 2021-10-18 | End: 2022-04-04

## 2021-10-18 NOTE — TELEPHONE ENCOUNTER
Received request via: Pharmacy  9/23/2021lov  Was the patient seen in the last year in this department? Yes    Does the patient have an active prescription (recently filled or refills available) for medication(s) requested? No

## 2021-11-02 ENCOUNTER — OFFICE VISIT (OUTPATIENT)
Dept: MEDICAL GROUP | Facility: LAB | Age: 26
End: 2021-11-02
Payer: COMMERCIAL

## 2021-11-02 VITALS
OXYGEN SATURATION: 96 % | HEART RATE: 70 BPM | BODY MASS INDEX: 26.84 KG/M2 | DIASTOLIC BLOOD PRESSURE: 68 MMHG | WEIGHT: 167 LBS | SYSTOLIC BLOOD PRESSURE: 94 MMHG | HEIGHT: 66 IN | TEMPERATURE: 98.6 F | RESPIRATION RATE: 12 BRPM

## 2021-11-02 DIAGNOSIS — Z23 NEED FOR VACCINATION: ICD-10-CM

## 2021-11-02 DIAGNOSIS — R05.9 COUGH: ICD-10-CM

## 2021-11-02 PROCEDURE — 90471 IMMUNIZATION ADMIN: CPT | Performed by: NURSE PRACTITIONER

## 2021-11-02 PROCEDURE — 90686 IIV4 VACC NO PRSV 0.5 ML IM: CPT | Performed by: NURSE PRACTITIONER

## 2021-11-02 PROCEDURE — 99213 OFFICE O/P EST LOW 20 MIN: CPT | Mod: 25 | Performed by: NURSE PRACTITIONER

## 2021-11-02 ASSESSMENT — PATIENT HEALTH QUESTIONNAIRE - PHQ9
9. THOUGHTS THAT YOU WOULD BE BETTER OFF DEAD, OR OF HURTING YOURSELF: NOT AT ALL
8. MOVING OR SPEAKING SO SLOWLY THAT OTHER PEOPLE COULD HAVE NOTICED. OR THE OPPOSITE, BEING SO FIGETY OR RESTLESS THAT YOU HAVE BEEN MOVING AROUND A LOT MORE THAN USUAL: NOT AT ALL
1. LITTLE INTEREST OR PLEASURE IN DOING THINGS: NOT AT ALL
7. TROUBLE CONCENTRATING ON THINGS, SUCH AS READING THE NEWSPAPER OR WATCHING TELEVISION: NOT AT ALL
6. FEELING BAD ABOUT YOURSELF - OR THAT YOU ARE A FAILURE OR HAVE LET YOURSELF OR YOUR FAMILY DOWN: NOT AL ALL
4. FEELING TIRED OR HAVING LITTLE ENERGY: NOT AT ALL
2. FEELING DOWN, DEPRESSED, IRRITABLE, OR HOPELESS: NOT AT ALL
3. TROUBLE FALLING OR STAYING ASLEEP OR SLEEPING TOO MUCH: NOT AT ALL
SUM OF ALL RESPONSES TO PHQ9 QUESTIONS 1 AND 2: 0
5. POOR APPETITE OR OVEREATING: NOT AT ALL
SUM OF ALL RESPONSES TO PHQ QUESTIONS 1-9: 0

## 2021-11-02 NOTE — PROGRESS NOTES
"    YARI Camacho is a 26 yo est female here with c/o new onset dry cough x 2 weeks, worse at night.  Improving slowly.  Negative covid test last week.   otc meds: nothing for cough but nsaids for HA (present x 2 d).  Denies SOB / wheezing.   Wk: pizza shop   Nonsmoker.    Denies heart burn.    Diarrhea x 1 yesterday.   No hx of asthma.    Denies sneezing / itchy eyes.    No other associated symptoms.      Past medical, surgical, family, and social history is reviewed and updated in Epic chart by me today.   Medications and allergies reviewed and updated in Epic chart by me today.     ROS:   Denies n/v/d or abdominal pain.     Exam:  BP (!) 94/68 (BP Location: Left arm, Patient Position: Sitting, BP Cuff Size: Adult)   Pulse 70   Temp 37 °C (98.6 °F)   Resp 12   Ht 1.676 m (5' 6\")   Wt 75.8 kg (167 lb)   SpO2 96%     Constitutional: Alert, no distress, plus 3 vital signs  Skin:  Warm, dry, no rashes invisible areas  Eye: Equal, round and reactive, conjunctiva clear  ENMT: Bilateral tympanic membranes are translucent - no sinus tenderness. Oropharynx is slightly injected without exudate. Tonsils surgically absent.   Neck: Mild anterior cervical, nontender lymphadenopathy  Respiratory: Unlabored respiration, lungs clear to auscultation, no wheezes, no rhonchi  Cardiovascular: Normal rate and rhythm  Psych: Alert, pleasant, well-groomed, normal affect    A/P:  1. Cough  -Benign exam today.  Recommend a trial of either nighttime Benadryl at 25 to 50 mg, allowing 8 hours for sleep or daytime newer generation antihistamine such as loratadine 10 mg.  Recommend a follow-up via email regarding how she is doing over the next 72 hours.  Discussed high water intake.    2. Need for vaccination  - INFLUENZA VACCINE QUAD INJ (PF)      "

## 2022-04-04 DIAGNOSIS — N92.0 MENORRHAGIA WITH REGULAR CYCLE: ICD-10-CM

## 2022-04-04 RX ORDER — NORETHINDRONE ACETATE/ETHINYL ESTRADIOL 1MG-20MCG
KIT ORAL
Qty: 63 TABLET | Refills: 2 | Status: SHIPPED | OUTPATIENT
Start: 2022-04-04 | End: 2023-03-06

## 2022-04-05 ENCOUNTER — OFFICE VISIT (OUTPATIENT)
Dept: URGENT CARE | Facility: CLINIC | Age: 27
End: 2022-04-05
Payer: COMMERCIAL

## 2022-04-05 VITALS
RESPIRATION RATE: 16 BRPM | DIASTOLIC BLOOD PRESSURE: 78 MMHG | HEART RATE: 87 BPM | TEMPERATURE: 97.8 F | OXYGEN SATURATION: 97 % | BODY MASS INDEX: 25.71 KG/M2 | WEIGHT: 160 LBS | SYSTOLIC BLOOD PRESSURE: 114 MMHG | HEIGHT: 66 IN

## 2022-04-05 DIAGNOSIS — J02.9 VIRAL PHARYNGITIS: ICD-10-CM

## 2022-04-05 PROCEDURE — 99213 OFFICE O/P EST LOW 20 MIN: CPT | Mod: 25 | Performed by: STUDENT IN AN ORGANIZED HEALTH CARE EDUCATION/TRAINING PROGRAM

## 2022-04-05 RX ORDER — CETIRIZINE HYDROCHLORIDE 10 MG/1
10 TABLET ORAL DAILY
Qty: 30 TABLET | Refills: 1 | Status: SHIPPED | OUTPATIENT
Start: 2022-04-05

## 2022-04-05 RX ORDER — KETOROLAC TROMETHAMINE 30 MG/ML
30 INJECTION, SOLUTION INTRAMUSCULAR; INTRAVENOUS ONCE
Status: COMPLETED | OUTPATIENT
Start: 2022-04-05 | End: 2022-04-05

## 2022-04-05 RX ORDER — MOMETASONE FUROATE 50 UG/1
2 SPRAY, METERED NASAL
Qty: 1 EACH | Refills: 1 | Status: SHIPPED | OUTPATIENT
Start: 2022-04-05

## 2022-04-05 RX ADMIN — KETOROLAC TROMETHAMINE 30 MG: 30 INJECTION, SOLUTION INTRAMUSCULAR; INTRAVENOUS at 11:57

## 2022-04-05 NOTE — LETTER
April 5, 2022       Patient: Doug Arora   YOB: 1995   Date of Visit: 4/5/2022         To Whom It May Concern:    Doug Arora was seen in urgent care on 4/5/2022 for her doctor's appointment.    If you have any questions or concerns, please don't hesitate to call [phone]          Sincerely,          Adrien Quiñones D.O.  Electronically Signed

## 2022-04-05 NOTE — PROGRESS NOTES
Subjective:   CHIEF COMPLAINT  Chief Complaint   Patient presents with   • Flu Like Symptoms     X3 days, Congested, at home Covid-19 test was negative yesterday    • Headache   • Fatigue       HPI  Doug Arora is a 26 y.o. female who presents with a chief complaint of a sore, scratchy throat associated with sinus congestion which started 2 days ago.  Home Covid test was negative.  She has tried taking ibuprofen which has not helped.  Positive ROS for slight cough and shortness of breath.  No wheezing.  No history of asthma or tobacco abuse.  She is vaccinated against COVID x3.    REVIEW OF SYSTEMS  General: no fever or chills  GI: no nausea or vomiting  See HPI for further details.    PAST MEDICAL HISTORY  Patient Active Problem List    Diagnosis Date Noted   • Recurrent major depressive disorder, in full remission (HCC) 11/10/2020   • Chronic bilateral low back pain without sciatica 02/12/2020   • Menorrhagia with regular cycle 03/07/2018       SURGICAL HISTORY   has a past surgical history that includes dental extraction(s) and tonsillectomy and adenoidectomy (Bilateral, 8/3/2020).    ALLERGIES  No Known Allergies    CURRENT MEDICATIONS  Home Medications     Reviewed by Adrien Quiñones D.O. (Physician) on 04/05/22 at 1140  Med List Status: <None>   Medication Last Dose Status   escitalopram (LEXAPRO) 10 MG Tab Taking Active   ketoconazole (NIZORAL) 2 % Cream Taking Active   SERJIO 1/20 1-20 MG-MCG per tablet Taking Active   multivitamin (THERAGRAN) Tab Taking Active                SOCIAL HISTORY  Social History     Tobacco Use   • Smoking status: Never Smoker   • Smokeless tobacco: Never Used   Vaping Use   • Vaping Use: Never used   Substance and Sexual Activity   • Alcohol use: Yes     Comment: occasional   • Drug use: Yes     Types: Inhaled, Marijuana     Comment: sometimes    • Sexual activity: Never       FAMILY HISTORY  No family history on file.       Objective:   PHYSICAL EXAM  VITAL SIGNS: BP  "114/78   Pulse 87   Temp 36.6 °C (97.8 °F) (Temporal)   Resp 16   Ht 1.676 m (5' 6\")   Wt 72.6 kg (160 lb)   SpO2 97%   BMI 25.82 kg/m²     Gen: no acute distress, normal voice  Skin: dry, intact, moist mucosal membranes  ENT: no pharyngeal erythema or exudates. Uvula midline   Lungs: CTAB w/ symmetric expansion   CV: RRR w/o murmurs or clicks  Psych: normal affect, normal judgement, alert, awake    Assessment/Plan:     1. Viral pharyngitis  ketorolac (TORADOL) injection 30 mg    mometasone (NASONEX) 50 MCG/ACT nasal spray    cetirizine (ZYRTEC) 10 MG Tab   home covid test was negative. Discussed symptomatic treatment.     Differential diagnosis, natural history, supportive care, and indications for immediate follow-up discussed. All questions answered. Patient agrees with the plan of care.    Follow-up as needed if symptoms worsen or fail to improve to PCP, Urgent care or Emergency Room.    Please note that this dictation was created using voice recognition software. I have made a reasonable attempt to correct obvious errors, but I expect that there are errors of grammar and possibly content that I did not discover before finalizing the note.         "

## 2022-12-21 NOTE — PROGRESS NOTES
CC  Pap / annual    HPI:  Doug is a 24 y.o.  female who presents for annual exam. Generally the patient is feeling good. She has no complaints or concerns.  Sexually active with one partner, denies any concerns about stds.  Regarding her health maintenance:   Last pap: 1 yr ago  Abnormal Pap hx: none  Periods: monthly  Contraception:  loestrin 1/20  Last Mammo:not applicable   Last colonoscopy:not applicable   Bone density test:N\A   Last Td:current   Influenza vaccination:current   Pneumococcal vaccination:not applicable   Hx STD''s: no   Regular exercise: yes    meds:     Current Outpatient Medications:   •  escitalopram, 10 mg, Oral, QDAY  •  norethindrone-ethinyl estradiol, 1 Tab, Oral, QDAY  •  fluticasone, 1 Spray, Nasal, DAILY, Taking    Allergies: No Known Allergies    family:   History reviewed. No pertinent family history.    social hx:   Social History     Socioeconomic History   • Marital status: Single     Spouse name: Not on file   • Number of children: Not on file   • Years of education: Not on file   • Highest education level: Not on file   Occupational History   • Not on file   Social Needs   • Financial resource strain: Not on file   • Food insecurity     Worry: Not on file     Inability: Not on file   • Transportation needs     Medical: Not on file     Non-medical: Not on file   Tobacco Use   • Smoking status: Never Smoker   • Smokeless tobacco: Never Used   Substance and Sexual Activity   • Alcohol use: Yes     Comment: occasional   • Drug use: No   • Sexual activity: Never   Lifestyle   • Physical activity     Days per week: Not on file     Minutes per session: Not on file   • Stress: Not on file   Relationships   • Social connections     Talks on phone: Not on file     Gets together: Not on file     Attends Anabaptism service: Not on file     Active member of club or organization: Not on file     Attends meetings of clubs or organizations: Not on file     Relationship status: Not on file   •  Pt with OLENA in the setting of longstanding DM, uncontrolled HTN and volume overload. Exact duration of OLENA however unknown. Pt admitted with SCr. of 3.5 (eGFR 18), was told 2 months ago that his kidneys were functioning at 30%. Send urine electrolytes, spot urine TP/CR. Please obtain Renal US. UA showing blood but only 4 RBCs. Please repeat UA prior to discharge. Check CPK, if abnormal myoglobin as well. Will need to consider HD if renal failure continues to worsen. Agree with IV Lasix 80mg IV BID. Please bladder scan and catheterize if retaining. Strict outputs and daily weights to guide diuresis. Flash pulmonary edema from uncontrolled HTN is on the differential in addition to HF. F/u Echo. Consider Renal Artery Duplex. Monitor labs and urine output. Optimize hemodynamics. Avoid NSAIDs, ACEI/ARBS, RCA and nephrotoxins. Dose medications as per eGFR. Do not resume Metformin of eGFR is not above 30. Check Blood gas. Appreciate Cardiology consult.     #Anemia: HgB 9.7. Please obtain TSAT.   #BMD: Please check Phos, calcium and PTH    Will need f/u with Nephrology on discharge.     If you have any questions, please feel free to contact me  Sb Lilly  Nephrology Fellow  [phone]; Prefer [employer] TEAMS  (After 5pm or on weekends please page the on-call fellow) "Intimate partner violence     Fear of current or ex partner: Not on file     Emotionally abused: Not on file     Physically abused: Not on file     Forced sexual activity: Not on file   Other Topics Concern   • Not on file   Social History Narrative   • Not on file       ROS:  No fever, chills, sweats.   No polydipsia, polyuria, temperature intolerance, significant weight changes   No visual changes, blurred vision.  No chest pain, palpitations, peripheral swelling   No chronic cough, shortness of breath, dyspnea with exertion.   No dysphagia, odynophagia, black or bloody stools.   No abdominal pain, nausea, persistent diarrhea, constipation   No dysuria, hematuria, incontinence. Denies nocturia  No rash, pruritis, pigment changes.   No focal weakness, syncope, headache, confusion, persistent numbness.   All other systems are reviewed and negative.    PHYSICAL EXAMINATION:  BP (!) 98/64 (BP Location: Left arm, Patient Position: Sitting, BP Cuff Size: Adult)   Pulse 80   Temp 37.2 °C (98.9 °F)   Resp 12   Ht 1.676 m (5' 6\")   Wt 61.2 kg (135 lb)   SpO2 97%     General appearance:healthy, well developed, well nourished  Psych: alert, no distress, cooperative  Eyes: EOM's normal, pupils equal, round, reactive to light  ENT: Ears: external ears normal to inspection and palpation, TM's clear, Nose/Sinuses: nose shows no deformity, asymmetry, or inflammation  Neck: no asymmetry, masses, or scars, no adenopathy, thyroid normal to palpation  Lungs:chest symmetric with normal A/P diameter, no chest deformities noted, normal respiratory rate and rhythm  Cardiovascular:regular rate and rhythm, S1 normal  Breasts: normal in size and symmetry, skin normal, physiologic fibronodularity  Abdomen: umbilicus normal, no masses palpable, no organomegaly  Musculoskeletal:ROM of all joints is normal, no evidence of joint instability  Lymphatic: None significantly enlarged  Skin: no rash, no edema  Neuro: mental status intact, " cranial nerves 2-12 intact  Pelvic: external genitalia normal, cervix normal in appearance, bimanual exam reveals normal uterus, adnexa without masses or tenderness, vaginal mucosa normal      ASSESSMENT/PLAN:  1.annual physical exam: HCM:  Pap and breast exams done.  BSE technique reviewed and patient encouraged to perform self-exam monthly.   Encourage monthly self breast exam  Encourage daily exercise for at least 30 minutes  Doing well on current OCP  hpv #3 and trumenba #2 today.  -Pt counseled on birth control pills. Risks, benefits and complications from hormone use reviewed. Failure rates discussed. Back up contraception and STD prevention discussed.  -pt was counseled regarding all immunizations, what the patient is being immunized against, possible side effects, and the importance of immunization.

## 2023-03-27 DIAGNOSIS — N92.0 MENORRHAGIA WITH REGULAR CYCLE: ICD-10-CM

## 2023-03-28 RX ORDER — NORETHINDRONE ACETATE AND ETHINYL ESTRADIOL 1; 20 MG/1; UG/1
TABLET ORAL
Qty: 21 TABLET | Refills: 0 | Status: SHIPPED | OUTPATIENT
Start: 2023-03-28 | End: 2023-04-28 | Stop reason: SDUPTHER

## 2023-03-28 NOTE — TELEPHONE ENCOUNTER
Received request via: Pharmacy    Was the patient seen in the last year in this department? No  LOV 11/02/2021  Does the patient have an active prescription (recently filled or refills available) for medication(s) requested? No    Does the patient have [facility] and need 100 day supply (blood pressure, diabetes and cholesterol meds only)? Patient does not have SCP

## 2023-04-27 DIAGNOSIS — N92.0 MENORRHAGIA WITH REGULAR CYCLE: ICD-10-CM

## 2023-04-28 RX ORDER — NORETHINDRONE ACETATE AND ETHINYL ESTRADIOL 1; 20 MG/1; UG/1
TABLET ORAL
Qty: 21 TABLET | Refills: 0 | Status: SHIPPED | OUTPATIENT
Start: 2023-04-28 | End: 2023-05-02

## 2023-04-28 NOTE — TELEPHONE ENCOUNTER
Received request via: Pharmacy    Was the patient seen in the last year in this department? No 11/2/21    Does the patient have an active prescription (recently filled or refills available) for medication(s) requested? No    Does the patient have [facility] and need 100 day supply (blood pressure, diabetes and cholesterol meds only)? Medication is not for cholesterol, blood pressure or diabetes

## 2023-05-02 DIAGNOSIS — N92.0 MENORRHAGIA WITH REGULAR CYCLE: ICD-10-CM

## 2023-05-02 RX ORDER — NORETHINDRONE ACETATE AND ETHINYL ESTRADIOL 1; 20 MG/1; UG/1
TABLET ORAL
Qty: 21 TABLET | Refills: 0 | Status: SHIPPED
Start: 2023-05-02 | End: 2023-10-03

## 2023-05-02 NOTE — OR NURSING
COVID-19 Pre-surgery screenin. Do you have an undiagnosed respiratory illness or symptoms such as coughing or sneezing? No    2. Do you have an unexplained fever greater than 100.4 degrees Fahrenheit or 38 degrees Celsius?     No    3. Have you had direct exposure to a patient who tested positive for Covid-19?    No    4. Have you had any loss of your sense of taste or smell? Have you had N/V or sore throat? No, although mild intermittent sore throat over the course of the last year d/t tonsillary illness which is what pt is having surgery for.     Patient has been informed of visitor policy and asked to wear a mask upon entering the hospital   Yes       25

## 2023-05-02 NOTE — TELEPHONE ENCOUNTER
Received request via: Pharmacy    Was the patient seen in the last year in this department? No 11/02/21    Does the patient have an active prescription (recently filled or refills available) for medication(s) requested? No    Does the patient have group home Plus and need 100 day supply (blood pressure, diabetes and cholesterol meds only)? Patient does not have SCP

## 2023-05-10 ENCOUNTER — OFFICE VISIT (OUTPATIENT)
Dept: MEDICAL GROUP | Facility: LAB | Age: 28
End: 2023-05-10
Payer: COMMERCIAL

## 2023-05-10 VITALS
HEART RATE: 71 BPM | WEIGHT: 164 LBS | BODY MASS INDEX: 26.36 KG/M2 | SYSTOLIC BLOOD PRESSURE: 110 MMHG | OXYGEN SATURATION: 94 % | DIASTOLIC BLOOD PRESSURE: 70 MMHG | RESPIRATION RATE: 12 BRPM | HEIGHT: 66 IN | TEMPERATURE: 97.1 F

## 2023-05-10 DIAGNOSIS — N92.0 MENORRHAGIA WITH REGULAR CYCLE: ICD-10-CM

## 2023-05-10 DIAGNOSIS — Z30.41 ENCOUNTER FOR SURVEILLANCE OF CONTRACEPTIVE PILLS: ICD-10-CM

## 2023-05-10 DIAGNOSIS — Z30.09 GENERAL COUNSELING AND ADVICE ON CONTRACEPTIVE MANAGEMENT: ICD-10-CM

## 2023-05-10 PROCEDURE — 99213 OFFICE O/P EST LOW 20 MIN: CPT | Performed by: NURSE PRACTITIONER

## 2023-05-10 RX ORDER — NORETHINDRONE ACETATE AND ETHINYL ESTRADIOL 1MG-20(21)
1 KIT ORAL DAILY
Qty: 28 TABLET | Refills: 11 | Status: SHIPPED | OUTPATIENT
Start: 2023-05-10

## 2023-05-10 ASSESSMENT — PATIENT HEALTH QUESTIONNAIRE - PHQ9: CLINICAL INTERPRETATION OF PHQ2 SCORE: 0

## 2023-05-10 NOTE — ASSESSMENT & PLAN NOTE
Menses 3-4 d and moderate flow  - much lighter than when she didn't take a pill.  Forgets to take a pill every once in awhile.  Nonsmoker.  No hx of blood clots.

## 2023-05-10 NOTE — PROGRESS NOTES
"Chief Complaint   Patient presents with    Medication Refill     Birth control              HPI:  Doug is a 27-year-old established female here with her new .  She is here to discuss contraception and for a refill of her birth control pill.  Overall doing pretty well on her birth control pill but struggles to remember it and would like to discuss other methods.  Does not desire children for several more years.  Without birth control has a heavy menses    Exam:  /70 (BP Location: Right arm, Patient Position: Sitting, BP Cuff Size: Adult)   Pulse 71   Temp 36.2 °C (97.1 °F) (Temporal)   Resp 12   Ht 1.676 m (5' 6\")   Wt 74.4 kg (164 lb)   SpO2 94%    Gen. appears healthy in no distress   Skin appropriate for sex and age   Neck trachea is midline  Lungs unlabored breathing  Heart regular rate  Neuro gait and station normal   Psych appropriate, calm, interactive, well-groomed      A/P:  1. Encounter for surveillance of contraceptive pills  norethindrone-ethinyl estradiol (LOESTRIN FE 1/20) 1-20 MG-MCG per tablet      2. Menorrhagia with regular cycle  norethindrone-ethinyl estradiol (LOESTRIN FE 1/20) 1-20 MG-MCG per tablet      3. General counseling and advice on contraceptive management        We discussed IUD, Nexplanon, Depo-Provera injections, NuvaRing and birth control pills.  She would like to continue on a birth control pill for now and will let me know if she changes her mind/would like to move forward with an IUD.  She does need a Pap smear and plans on returning for that in about 3 months.    Pt counseled on birth control pills. Risks, benefits and complications from hormone use reviewed. Failure rates discussed. Back up contraception and STD prevention discussed.  "

## 2023-09-15 NOTE — PROGRESS NOTES
"Nutrition Reassess    3/28/2017    EUGENIA Perez   21 y.o.   Time: in/out 10:00-10:18am       Subjective:  Pt is tracking her calorie intake on [employer] dolly. States she is averaging around 2000kcal per day. Only was under 1700kcal one day. Biggest changes have been eating breakfast every day (has the same thing and worried about getting bored of this); also has been more consistently having 2 snacks a day. Has not tried a protein drink yet but plans to. Is taking the multivitamin she had at home more consistently but not daily.     Anthropometrics/Objective    Filed Vitals:    03/28/17 1452   Height: 1.689 m (5' 6.5\")   Weight: 49.986 kg (110 lb 3.2 oz)     Body mass index is 17.52 kg/(m^2).    Previous weight/date:  107.4lb  Change : +2.8lb in 2 weeks     Stated Goal Weight: 120-130lb   Estimated calorie needs: 1700-2000kcal per day     Diet Recall  Time   :B   PB banana sandwich    :S  None    :L   Bean and cheese burrito Or veggie sandwich ; same as usual    :S  Granola bar    :D   Same as usual. But has added more meat substitutes. Tacos. Or pasta etc.    :S   Coffee and nuts or other snack food   Averaging ~2000kcal per day. Only <1700kcal on 1 day. Protein intake ranging from 10-20% of calories; more often <15%    ReAssesment/Notes:  Pt has gained sufficient weight within our goal in the past 2 weeks. However I want to make sure she can maintain this , as that was one of her main concerns. She does feel that the food journal has really helped her to be aware and agrees to do it for a few more weeks. Still has to build some new habits such as eating breakfast (she didn't eat today). Also would like her to be better about taking her multivitamin daily and she is not getting enough protein. Her goal should be at least 15% of kcal on average. Or about 75-85g protein per day. She will try a few protein drinks. She is feeling good about her progress. We will meet again in about 4 weeks to ensure she " is still gaining 1-2 lb per week.     Follow-up: 4-6 weeks        Never

## 2023-10-03 ENCOUNTER — OFFICE VISIT (OUTPATIENT)
Dept: MEDICAL GROUP | Facility: LAB | Age: 28
End: 2023-10-03
Payer: COMMERCIAL

## 2023-10-03 VITALS
SYSTOLIC BLOOD PRESSURE: 110 MMHG | WEIGHT: 160 LBS | OXYGEN SATURATION: 95 % | RESPIRATION RATE: 12 BRPM | BODY MASS INDEX: 25.71 KG/M2 | HEIGHT: 66 IN | TEMPERATURE: 97.5 F | DIASTOLIC BLOOD PRESSURE: 70 MMHG | HEART RATE: 78 BPM

## 2023-10-03 DIAGNOSIS — Z23 NEED FOR VACCINATION: ICD-10-CM

## 2023-10-03 DIAGNOSIS — L02.92 BOIL: ICD-10-CM

## 2023-10-03 PROCEDURE — 90471 IMMUNIZATION ADMIN: CPT | Performed by: NURSE PRACTITIONER

## 2023-10-03 PROCEDURE — 3074F SYST BP LT 130 MM HG: CPT | Performed by: NURSE PRACTITIONER

## 2023-10-03 PROCEDURE — 3078F DIAST BP <80 MM HG: CPT | Performed by: NURSE PRACTITIONER

## 2023-10-03 PROCEDURE — 90686 IIV4 VACC NO PRSV 0.5 ML IM: CPT | Performed by: NURSE PRACTITIONER

## 2023-10-03 PROCEDURE — 99213 OFFICE O/P EST LOW 20 MIN: CPT | Mod: 25 | Performed by: NURSE PRACTITIONER

## 2023-10-03 RX ORDER — SULFAMETHOXAZOLE AND TRIMETHOPRIM 800; 160 MG/1; MG/1
1 TABLET ORAL 2 TIMES DAILY
Qty: 14 TABLET | Refills: 0 | Status: SHIPPED
Start: 2023-10-03 | End: 2023-10-25

## 2023-10-03 NOTE — PROGRESS NOTES
"Chief Complaint   Patient presents with    Bump     Armpit/ left X 2 months grown and painful       HPI: Doug is a 27 y.o. established female who presents today with a \"lump\" in her left armpit. She first noticed it about 2 months ago when she was in the shower, at which time she described it as a small \"lump\" but she could not see it, and it was not painful or red. She is currently getting over a cold and noticed in the past 3 days that the \"lump\" has gotten bigger, is now red, and is painful. She has not tried any treatment, she has not noticed any discharge, and denies fevers.      Exam:   Gen: pleasant, 26 yo female in NAD accompanied by her boyfriend.  Resp: Non labored, able to speak in full sentences  Skin: Left axilla has approximately 4 cm erythematous, hardened, boil. No other boils or lumps notes in right axilla  Vitals:    10/03/23 0753   BP: 110/70   Pulse: 78   Resp: 12   Temp: 36.4 °C (97.5 °F)   SpO2: 95%       Assessment/Plan:   1. Boil  sulfamethoxazole-trimethoprim (BACTRIM DS) 800-160 MG tablet      2. Need for vaccination  INFLUENZA VACCINE QUAD INJ (PF)        1) Heat pack to the area to ease discomfort and help heal. Bactrim 800/160 BID x 7 days.    Will follow up via My Chart if it is not improving or worsening over the next week with treatment discussed above.    "

## 2023-10-03 NOTE — PROGRESS NOTES
"Chief Complaint   Patient presents with    Bump     Armpit/ left X 2 months grown and painful       HPI:  I saw the patient with the student, Courtney.  I performed a physical exam and medical decision making.  I reviewed, verified, the documentation on this date and agree with the content and plan as written by this student.     Exam:   /70 (BP Location: Left arm, Patient Position: Sitting, BP Cuff Size: Adult)   Pulse 78   Temp 36.4 °C (97.5 °F)   Resp 12   Ht 1.676 m (5' 6\")   Wt 72.6 kg (160 lb)   LMP 09/20/2023   SpO2 95%   BMI 25.82 kg/m²   Gen:  NAD, WD, WN  Skin:  4 cm x 3 cm firm, erythematous, tender boil / cystic structure to proximal left axillary region without fluctuance or surrounding induration.  No other surrounding lymph nodes.     Assessment / Plan:  1. Boil  sulfamethoxazole-trimethoprim (BACTRIM DS) 800-160 MG tablet      2. Need for vaccination  INFLUENZA VACCINE QUAD INJ (PF)      Appears infected.  Encouraged warm compresses.  Start bactrim ds bid x 7 d.  F/u one week for recheck if not improving.      Flu vaccine given.    F/u pap 10-14 d.      "

## 2023-10-17 ENCOUNTER — TELEPHONE (OUTPATIENT)
Dept: MEDICAL GROUP | Facility: LAB | Age: 28
End: 2023-10-17

## 2023-10-17 NOTE — LETTER
10/17/2023    Doug Arora  [address] [place] [address]    Dear Doug Valle,    Your care is very important to us, and we have noticed that on 10/17/2023, you missed your appointment with KIRAN Love. at [facility]    We’re committed to providing you with the best care possible. Your appointment time is reserved for you and your provider to discuss any current or new health concerns and, together, determine the best plan of care for you. Please call [phone] to reschedule at your earliest convenience.    In some cases, [facility] offers additional resources to make your healthcare more accessible, including transportation assistance, financial assistance and virtual visits. To learn more about these resources, please call [phone].    In order to keep you as informed as possible, below is a brief summary of our policy regarding missed appointments:  If a patient “No Shows”  three (3) or more appointments within a rolling 12-month period, they may be dismissed from the practice for failure to follow clinician recommendations.     If you have any concerns regarding the care you are receiving, please talk with your provider or call the office at [phone] and request to speak with the Practice . We’re committed to providing excellent care, and your feedback is invaluable.    Sincerely,    KIRAN Love.

## 2023-10-25 ENCOUNTER — HOSPITAL ENCOUNTER (OUTPATIENT)
Facility: MEDICAL CENTER | Age: 28
End: 2023-10-25
Attending: NURSE PRACTITIONER
Payer: COMMERCIAL

## 2023-10-25 ENCOUNTER — OFFICE VISIT (OUTPATIENT)
Dept: MEDICAL GROUP | Facility: LAB | Age: 28
End: 2023-10-25
Payer: COMMERCIAL

## 2023-10-25 VITALS
DIASTOLIC BLOOD PRESSURE: 74 MMHG | HEART RATE: 82 BPM | BODY MASS INDEX: 25.88 KG/M2 | TEMPERATURE: 97.3 F | WEIGHT: 161 LBS | RESPIRATION RATE: 12 BRPM | OXYGEN SATURATION: 96 % | HEIGHT: 66 IN | SYSTOLIC BLOOD PRESSURE: 110 MMHG

## 2023-10-25 DIAGNOSIS — Z12.4 SCREENING FOR MALIGNANT NEOPLASM OF CERVIX: ICD-10-CM

## 2023-10-25 DIAGNOSIS — Z01.419 ENCOUNTER FOR GYNECOLOGICAL EXAMINATION: ICD-10-CM

## 2023-10-25 LAB — AMBIGUOUS DTTM AMBI4: NORMAL

## 2023-10-25 PROCEDURE — 3078F DIAST BP <80 MM HG: CPT | Performed by: NURSE PRACTITIONER

## 2023-10-25 PROCEDURE — 88175 CYTOPATH C/V AUTO FLUID REDO: CPT

## 2023-10-25 PROCEDURE — 87591 N.GONORRHOEAE DNA AMP PROB: CPT

## 2023-10-25 PROCEDURE — 3074F SYST BP LT 130 MM HG: CPT | Performed by: NURSE PRACTITIONER

## 2023-10-25 PROCEDURE — 99395 PREV VISIT EST AGE 18-39: CPT | Performed by: NURSE PRACTITIONER

## 2023-10-25 PROCEDURE — 87491 CHLMYD TRACH DNA AMP PROBE: CPT

## 2023-10-25 SDOH — ECONOMIC STABILITY: HOUSING INSECURITY: IN THE LAST 12 MONTHS, HOW MANY PLACES HAVE YOU LIVED?: 2

## 2023-10-25 SDOH — ECONOMIC STABILITY: FOOD INSECURITY: WITHIN THE PAST 12 MONTHS, THE FOOD YOU BOUGHT JUST DIDN'T LAST AND YOU DIDN'T HAVE MONEY TO GET MORE.: SOMETIMES TRUE

## 2023-10-25 SDOH — ECONOMIC STABILITY: HOUSING INSECURITY
IN THE LAST 12 MONTHS, WAS THERE A TIME WHEN YOU DID NOT HAVE A STEADY PLACE TO SLEEP OR SLEPT IN A SHELTER (INCLUDING NOW)?: NO

## 2023-10-25 SDOH — ECONOMIC STABILITY: TRANSPORTATION INSECURITY
IN THE PAST 12 MONTHS, HAS LACK OF RELIABLE TRANSPORTATION KEPT YOU FROM MEDICAL APPOINTMENTS, MEETINGS, WORK OR FROM GETTING THINGS NEEDED FOR DAILY LIVING?: NO

## 2023-10-25 SDOH — ECONOMIC STABILITY: INCOME INSECURITY: IN THE LAST 12 MONTHS, WAS THERE A TIME WHEN YOU WERE NOT ABLE TO PAY THE MORTGAGE OR RENT ON TIME?: NO

## 2023-10-25 SDOH — ECONOMIC STABILITY: FOOD INSECURITY: WITHIN THE PAST 12 MONTHS, YOU WORRIED THAT YOUR FOOD WOULD RUN OUT BEFORE YOU GOT MONEY TO BUY MORE.: SOMETIMES TRUE

## 2023-10-25 SDOH — ECONOMIC STABILITY: TRANSPORTATION INSECURITY
IN THE PAST 12 MONTHS, HAS THE LACK OF TRANSPORTATION KEPT YOU FROM MEDICAL APPOINTMENTS OR FROM GETTING MEDICATIONS?: NO

## 2023-10-25 SDOH — ECONOMIC STABILITY: TRANSPORTATION INSECURITY
IN THE PAST 12 MONTHS, HAS LACK OF TRANSPORTATION KEPT YOU FROM MEETINGS, WORK, OR FROM GETTING THINGS NEEDED FOR DAILY LIVING?: NO

## 2023-10-25 SDOH — HEALTH STABILITY: PHYSICAL HEALTH: ON AVERAGE, HOW MANY MINUTES DO YOU ENGAGE IN EXERCISE AT THIS LEVEL?: 30 MIN

## 2023-10-25 SDOH — HEALTH STABILITY: MENTAL HEALTH
STRESS IS WHEN SOMEONE FEELS TENSE, NERVOUS, ANXIOUS, OR CAN'T SLEEP AT NIGHT BECAUSE THEIR MIND IS TROUBLED. HOW STRESSED ARE YOU?: TO SOME EXTENT

## 2023-10-25 SDOH — ECONOMIC STABILITY: INCOME INSECURITY: HOW HARD IS IT FOR YOU TO PAY FOR THE VERY BASICS LIKE FOOD, HOUSING, MEDICAL CARE, AND HEATING?: HARD

## 2023-10-25 SDOH — HEALTH STABILITY: PHYSICAL HEALTH: ON AVERAGE, HOW MANY DAYS PER WEEK DO YOU ENGAGE IN MODERATE TO STRENUOUS EXERCISE (LIKE A BRISK WALK)?: 3 DAYS

## 2023-10-25 ASSESSMENT — SOCIAL DETERMINANTS OF HEALTH (SDOH)
DO YOU BELONG TO ANY CLUBS OR ORGANIZATIONS SUCH AS CHURCH GROUPS UNIONS, FRATERNAL OR ATHLETIC GROUPS, OR SCHOOL GROUPS?: NO
DO YOU BELONG TO ANY CLUBS OR ORGANIZATIONS SUCH AS CHURCH GROUPS UNIONS, FRATERNAL OR ATHLETIC GROUPS, OR SCHOOL GROUPS?: NO
HOW OFTEN DO YOU GET TOGETHER WITH FRIENDS OR RELATIVES?: THREE TIMES A WEEK
HOW OFTEN DO YOU HAVE A DRINK CONTAINING ALCOHOL: 2-4 TIMES A MONTH
HOW MANY DRINKS CONTAINING ALCOHOL DO YOU HAVE ON A TYPICAL DAY WHEN YOU ARE DRINKING: 1 OR 2
HOW HARD IS IT FOR YOU TO PAY FOR THE VERY BASICS LIKE FOOD, HOUSING, MEDICAL CARE, AND HEATING?: HARD
HOW OFTEN DO YOU ATTEND CHURCH OR RELIGIOUS SERVICES?: NEVER
HOW OFTEN DO YOU ATTENT MEETINGS OF THE CLUB OR ORGANIZATION YOU BELONG TO?: PATIENT DECLINED
HOW OFTEN DO YOU ATTEND CHURCH OR RELIGIOUS SERVICES?: NEVER
HOW OFTEN DO YOU HAVE SIX OR MORE DRINKS ON ONE OCCASION: NEVER
IN A TYPICAL WEEK, HOW MANY TIMES DO YOU TALK ON THE PHONE WITH FAMILY, FRIENDS, OR NEIGHBORS?: MORE THAN THREE TIMES A WEEK
IN A TYPICAL WEEK, HOW MANY TIMES DO YOU TALK ON THE PHONE WITH FAMILY, FRIENDS, OR NEIGHBORS?: MORE THAN THREE TIMES A WEEK
HOW OFTEN DO YOU GET TOGETHER WITH FRIENDS OR RELATIVES?: THREE TIMES A WEEK
HOW OFTEN DO YOU ATTENT MEETINGS OF THE CLUB OR ORGANIZATION YOU BELONG TO?: PATIENT DECLINED
WITHIN THE PAST 12 MONTHS, YOU WORRIED THAT YOUR FOOD WOULD RUN OUT BEFORE YOU GOT THE MONEY TO BUY MORE: SOMETIMES TRUE

## 2023-10-25 ASSESSMENT — LIFESTYLE VARIABLES
AUDIT-C TOTAL SCORE: 2
HOW OFTEN DO YOU HAVE SIX OR MORE DRINKS ON ONE OCCASION: NEVER
HOW OFTEN DO YOU HAVE A DRINK CONTAINING ALCOHOL: 2-4 TIMES A MONTH
HOW MANY STANDARD DRINKS CONTAINING ALCOHOL DO YOU HAVE ON A TYPICAL DAY: 1 OR 2
SKIP TO QUESTIONS 9-10: 1

## 2023-10-25 NOTE — PROGRESS NOTES
Chief Complaint   Patient presents with    Annual Exam       HPI:  Doug is a 27 y.o. est female who presents for annual exam. Generally the patient is feeling good. She has no complaints or concerns.   Sexually active with 1 partner.    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  Regarding her health maintenance:   Last pap: 2020  Abnormal Pap hx: none  Periods: monthly and medium flow - lasts 3-4 days.   Contraception:  loestrin 1/20  No std concerns.    Denies breast or pelvic pain.    Good bowel / bladder movements without pain or blood.   meds:     Current Outpatient Medications:     norethindrone-ethinyl estradiol, 1 Tablet, Oral, DAILY    mometasone, 2 Spray, Nasal, QHS    cetirizine, 10 mg, Oral, DAILY       Allergies: No Known Allergies    social hx:   Social History     Socioeconomic History    Marital status:      Spouse name: Not on file    Number of children: Not on file    Years of education: Not on file    Highest education level: Associate degree: academic program   Occupational History    Not on file   Tobacco Use    Smoking status: Never    Smokeless tobacco: Never   Vaping Use    Vaping Use: Never used   Substance and Sexual Activity    Alcohol use: Yes     Comment: occasional    Drug use: Yes     Types: Inhaled, Marijuana     Comment: sometimes     Sexual activity: Never   Other Topics Concern    Not on file   Social History Narrative    Not on file     Social Determinants of Health     Financial Resource Strain: High Risk (10/25/2023)    Overall Financial Resource Strain (CARDIA)     Difficulty of Paying Living Expenses: Hard   Food Insecurity: Food Insecurity Present (10/25/2023)    Hunger Vital Sign     Worried About Running Out of Food in the Last Year: Sometimes true     Ran Out of Food in the Last Year: Sometimes true   Transportation Needs: No Transportation Needs (10/25/2023)    PRAPARE - Transportation     Lack of Transportation (Medical): No     Lack of  "Transportation (Non-Medical): No   Physical Activity: Insufficiently Active (10/25/2023)    Exercise Vital Sign     Days of Exercise per Week: 3 days     Minutes of Exercise per Session: 30 min   Stress: Stress Concern Present (10/25/2023)    [employer] - Occupational Stress Questionnaire     Feeling of Stress : To some extent   Social Connections: Moderately Isolated (10/25/2023)    Social Connection and Isolation Panel [NHANES]     Frequency of Communication with Friends and Family: More than three times a week     Frequency of Social Gatherings with Friends and Family: Three times a week     Attends [employer] Services: Never     Active Member of Clubs or Organizations: No     Attends Club or Organization Meetings: Patient refused     Marital Status:    Intimate Partner Violence: Not on file   Housing Stability: Low Risk  (10/25/2023)    Housing Stability Vital Sign     Unable to Pay for Housing in the Last Year: No     Number of Places Lived in the Last Year: 2     Unstable Housing in the Last Year: No       ROS:  No fever, chills, sweats.   No polydipsia, polyuria, temperature intolerance, significant weight changes   No visual changes, blurred vision.  No chest pain, palpitations, peripheral swelling   No chronic cough, shortness of breath, dyspnea with exertion.   No dysphagia, odynophagia, black or bloody stools.   No abdominal pain, nausea, persistent diarrhea, constipation   No dysuria, hematuria, incontinence. Denies nocturia  No rash, pruritis, pigment changes.   No focal weakness, syncope, headache, confusion, persistent numbness.   All other systems are reviewed and negative.    PHYSICAL EXAMINATION:  /74 (BP Location: Left arm, Patient Position: Sitting, BP Cuff Size: Adult)   Pulse 82   Temp 36.3 °C (97.3 °F)   Resp 12   Ht 1.676 m (5' 6\")   Wt 73 kg (161 lb)   SpO2 96%   General appearance:healthy, well developed, well nourished  Psych: alert, no " distress, cooperative  Eyes: EOM's normal, pupils equal, round, reactive to light  ENT: Ears: external ears normal to inspection and palpation, TM's clear, Nose/Sinuses: nose shows no deformity, asymmetry, or inflammation  Neck: no asymmetry, masses, or scars, no adenopathy, thyroid normal to palpation  Lungs:chest symmetric with normal A/P diameter, no chest deformities noted, normal respiratory rate and rhythm  Cardiovascular:regular rate and rhythm, S1 normal  Breasts: normal in size and symmetry, skin normal, physiologic fibronodularity  Abdomen: umbilicus normal, no masses palpable, no organomegaly  Musculoskeletal:ROM of all joints is normal, no evidence of joint instability  Lymphatic: None significantly enlarged  Skin: no rash, no edema  Neuro: mental status intact, cranial nerves 2-12 intact  Pelvic: external genitalia normal, cervix normal in appearance, bimanual exam reveals normal uterus, adnexa without masses or tenderness, vaginal mucosa normal      ASSESSMENT/PLAN:  1.annual physical exam: HCM:  Pap and breast exams done.  BSE technique reviewed and patient encouraged to perform self-exam monthly.   Encourage daily exercise for at least 30 minutes  Recommend mammogram every year starting age 40.    Recommend 1500 mg Calcium with 600 units vit d daily.    Pap q 3 yrs if today's is normal.   2. Contraception: doing well on current oral contraceptive and would like to continue this.  Pt counseled on birth control pills. Risks, benefits and complications from hormone use reviewed. Failure rates discussed. Back up contraception and STD prevention discussed.      In terms of healthcare maintenance, encouraged her to obtain the latest COVID booster.  She did not have any STD concerns.    Follow-up yearly.

## 2023-10-27 LAB
C TRACH RRNA CVX QL NAA+PROBE: NEGATIVE
COMMENT NL11729A: NORMAL
CYTOLOGIST CVX/VAG CYTO: NORMAL
CYTOLOGY CVX/VAG DOC CYTO: NORMAL
CYTOLOGY CVX/VAG DOC THIN PREP: NORMAL
N GONORRHOEA RRNA CVX QL NAA+PROBE: NEGATIVE
NOTE NL11727A: NORMAL
OTHER STN SPEC: NORMAL
STAT OF ADQ CVX/VAG CYTO-IMP: NORMAL

## 2024-04-25 DIAGNOSIS — Z30.41 ENCOUNTER FOR SURVEILLANCE OF CONTRACEPTIVE PILLS: ICD-10-CM

## 2024-04-25 DIAGNOSIS — N92.0 MENORRHAGIA WITH REGULAR CYCLE: ICD-10-CM

## 2024-04-25 RX ORDER — NORETHINDRONE ACETATE AND ETHINYL ESTRADIOL AND FERROUS FUMARATE 1MG-20(21)
1 KIT ORAL
Qty: 28 TABLET | Refills: 11 | Status: SHIPPED | OUTPATIENT
Start: 2024-04-25

## 2024-08-13 ENCOUNTER — PATIENT MESSAGE (OUTPATIENT)
Dept: HEALTH INFORMATION MANAGEMENT | Facility: OTHER | Age: 29
End: 2024-08-13

## 2024-09-18 ENCOUNTER — TELEPHONE (OUTPATIENT)
Dept: HEALTH INFORMATION MANAGEMENT | Facility: OTHER | Age: 29
End: 2024-09-18

## 2024-11-20 ENCOUNTER — PATIENT MESSAGE (OUTPATIENT)
Dept: MEDICAL GROUP | Facility: LAB | Age: 29
End: 2024-11-20

## 2024-11-27 ENCOUNTER — DOCUMENTATION (OUTPATIENT)
Dept: HEALTH INFORMATION MANAGEMENT | Facility: OTHER | Age: 29
End: 2024-11-27

## 2024-12-17 ENCOUNTER — APPOINTMENT (OUTPATIENT)
Dept: MEDICAL GROUP | Facility: LAB | Age: 29
End: 2024-12-17

## 2024-12-17 VITALS
HEART RATE: 84 BPM | SYSTOLIC BLOOD PRESSURE: 110 MMHG | HEIGHT: 67 IN | WEIGHT: 145 LBS | TEMPERATURE: 97.6 F | BODY MASS INDEX: 22.76 KG/M2 | DIASTOLIC BLOOD PRESSURE: 60 MMHG | RESPIRATION RATE: 12 BRPM | OXYGEN SATURATION: 95 %

## 2024-12-17 DIAGNOSIS — Z23 NEED FOR VACCINATION: ICD-10-CM

## 2024-12-17 DIAGNOSIS — F41.9 ANXIETY: ICD-10-CM

## 2024-12-17 DIAGNOSIS — F33.42 RECURRENT MAJOR DEPRESSIVE DISORDER, IN FULL REMISSION (HCC): ICD-10-CM

## 2024-12-17 DIAGNOSIS — Z30.09 COUNSELING FOR BIRTH CONTROL REGARDING INTRAUTERINE DEVICE (IUD): ICD-10-CM

## 2024-12-17 PROCEDURE — 3078F DIAST BP <80 MM HG: CPT | Performed by: NURSE PRACTITIONER

## 2024-12-17 PROCEDURE — 99213 OFFICE O/P EST LOW 20 MIN: CPT | Mod: 25 | Performed by: NURSE PRACTITIONER

## 2024-12-17 PROCEDURE — 3074F SYST BP LT 130 MM HG: CPT | Performed by: NURSE PRACTITIONER

## 2024-12-17 PROCEDURE — 90656 IIV3 VACC NO PRSV 0.5 ML IM: CPT | Performed by: NURSE PRACTITIONER

## 2024-12-17 PROCEDURE — 90471 IMMUNIZATION ADMIN: CPT | Performed by: NURSE PRACTITIONER

## 2024-12-17 RX ORDER — ALPRAZOLAM 0.5 MG
0.5 TABLET ORAL
Qty: 2 TABLET | Refills: 0 | Status: SHIPPED | OUTPATIENT
Start: 2024-12-17 | End: 2024-12-18

## 2024-12-17 ASSESSMENT — PATIENT HEALTH QUESTIONNAIRE - PHQ9
9. THOUGHTS THAT YOU WOULD BE BETTER OFF DEAD, OR OF HURTING YOURSELF: NOT AT ALL
2. FEELING DOWN, DEPRESSED, IRRITABLE, OR HOPELESS: NOT AT ALL
1. LITTLE INTEREST OR PLEASURE IN DOING THINGS: NOT AT ALL
7. TROUBLE CONCENTRATING ON THINGS, SUCH AS READING THE NEWSPAPER OR WATCHING TELEVISION: NOT AT ALL
4. FEELING TIRED OR HAVING LITTLE ENERGY: NOT AT ALL
3. TROUBLE FALLING OR STAYING ASLEEP OR SLEEPING TOO MUCH: NOT AT ALL
8. MOVING OR SPEAKING SO SLOWLY THAT OTHER PEOPLE COULD HAVE NOTICED. OR THE OPPOSITE, BEING SO FIGETY OR RESTLESS THAT YOU HAVE BEEN MOVING AROUND A LOT MORE THAN USUAL: NOT AT ALL
6. FEELING BAD ABOUT YOURSELF - OR THAT YOU ARE A FAILURE OR HAVE LET YOURSELF OR YOUR FAMILY DOWN: NOT AL ALL
5. POOR APPETITE OR OVEREATING: NOT AT ALL
SUM OF ALL RESPONSES TO PHQ9 QUESTIONS 1 AND 2: 0
SUM OF ALL RESPONSES TO PHQ QUESTIONS 1-9: 0

## 2024-12-17 NOTE — PROGRESS NOTES
"Verbal consent was acquired by the patient to use [employer] ambient listening note generation during this visit Yes      Subjective   Doug Arora is a 29 y.o. female who presents for iud converstation.   History of Present Illness  The patient is a 29-year-old female who presents for evaluation of IUD discussion.     She expresses a desire to transition from oral contraceptives to an intrauterine device (IUD) due to frequent lapses in medication adherence. She reports no other issues with the pill. Her menstrual cycle is generally regular, although she experiences dysmenorrhea. She has conducted some research on IUDs and has concerns about potential complications, including dislodgement. She has no history of childbirth and does not plan to conceive within the next year.     Moods good - denies depressive symptoms.     SOCIAL HISTORY  She does not smoke cigarettes. She occasionally consumes alcohol but does not drink regularly.    Review of Systems  Neg except hpi  Objective   /60 (BP Location: Right arm, Patient Position: Sitting, BP Cuff Size: Adult)   Pulse 84   Temp 36.4 °C (97.6 °F)   Resp 12   Ht 1.689 m (5' 6.5\")   Wt 65.8 kg (145 lb)   SpO2 95%   Physical Exam    Gen: NAD  Resp: nonlabored.  Able to speak in full sentences  Psy: pleasant / cooperative.   Neuro:  Alert and oriented x 3           Assessment & Plan  1. Transition from oral contraceptives to an intrauterine device (IUD).  A comprehensive discussion was held regarding the benefits and potential complications associated with IUD insertion. She was informed that the IUD contains progesterone, which may reduce or eliminate her periods, with 90% of users experiencing no bleeding after six months. Kyleena is effective x 5 yrs but it does not protect against sexually transmitted diseases (STDs). She was reassured that complications such as infection or uterine perforation are extremely rare. She will be given misoprostol to dilate " the cervix and Xanax to alleviate anxiety prior to the procedure. A prescription for Xanax, consisting of two tablets, will be sent to her pharmacy at [facility]. She is advised to take one tablet 45 minutes before the IUD insertion and to arrange for transportation home post-procedure.  I will call her when her IUD is in stock and bring her in for the procedure.    2.  Major depressive disorder: In remission.    An influenza vaccine will also be administered during this visit.               Please note that this dictation was created using voice recognition software. I have made every reasonable attempt to correct obvious errors, but I expect that there are errors of grammar and possibly content that I did not discover before finalizing the note.

## 2024-12-31 ENCOUNTER — PATIENT MESSAGE (OUTPATIENT)
Dept: MEDICAL GROUP | Facility: LAB | Age: 29
End: 2024-12-31
Payer: COMMERCIAL

## 2024-12-31 ENCOUNTER — TELEPHONE (OUTPATIENT)
Dept: MEDICAL GROUP | Facility: LAB | Age: 29
End: 2024-12-31
Payer: COMMERCIAL

## 2024-12-31 NOTE — TELEPHONE ENCOUNTER
Received IUD and lace in procedure rm 2 tall cabinet/ patient nt via my chart IUD received and to schedule 40 min appt

## 2025-01-05 RX ORDER — MISOPROSTOL 100 UG/1
TABLET ORAL
Qty: 2 TABLET | Refills: 0 | Status: SHIPPED | OUTPATIENT
Start: 2025-01-05

## 2025-01-07 ENCOUNTER — OFFICE VISIT (OUTPATIENT)
Dept: MEDICAL GROUP | Facility: LAB | Age: 30
End: 2025-01-07
Payer: COMMERCIAL

## 2025-01-07 VITALS
RESPIRATION RATE: 12 BRPM | HEIGHT: 67 IN | TEMPERATURE: 98.2 F | BODY MASS INDEX: 22.44 KG/M2 | WEIGHT: 143 LBS | SYSTOLIC BLOOD PRESSURE: 110 MMHG | HEART RATE: 70 BPM | OXYGEN SATURATION: 99 % | DIASTOLIC BLOOD PRESSURE: 76 MMHG

## 2025-01-07 DIAGNOSIS — Z30.430 ENCOUNTER FOR IUD INSERTION: ICD-10-CM

## 2025-01-07 LAB
POCT INT CON NEG: NEGATIVE
POCT INT CON POS: POSITIVE
POCT URINE PREGNANCY TEST: NEGATIVE

## 2025-01-07 PROCEDURE — 58300 INSERT INTRAUTERINE DEVICE: CPT | Performed by: NURSE PRACTITIONER

## 2025-01-07 PROCEDURE — 81025 URINE PREGNANCY TEST: CPT | Performed by: NURSE PRACTITIONER

## 2025-01-07 PROCEDURE — 3074F SYST BP LT 130 MM HG: CPT | Performed by: NURSE PRACTITIONER

## 2025-01-07 PROCEDURE — 3078F DIAST BP <80 MM HG: CPT | Performed by: NURSE PRACTITIONER

## 2025-01-07 ASSESSMENT — PATIENT HEALTH QUESTIONNAIRE - PHQ9: CLINICAL INTERPRETATION OF PHQ2 SCORE: 0

## 2025-01-07 NOTE — PROGRESS NOTES
"Verbal consent was acquired by the patient to use [employer] ambient listening note generation during this visit Yes      Subjective   Doug Arora is a 29 y.o. female who presents for IUD insertion  History of Present Illness  The patient is a 29-year-old established female who presents for IUD insertion.    She has expressed her desire to proceed with the IUD insertion. She was unable to obtain misoprostol from the pharmacy due to a lack of communication regarding its availability. Her last menstrual cycle concluded three days prior to today. She has discontinued her birth control regimen upon completion of the final pack several weeks ago. She typically manages menstrual discomfort with ibuprofen. She engages in heavy weightlifting exercises at the gym. She reports no straining during bowel movements. She does not have any known allergies to shrimp.  She is sexually active with 1 partner.  No previous pregnancies.    ALLERGIES  The patient has no known allergies.    MEDICATIONS  Current: Xanax    Review of Systems  Negative except for HPI  Objective   /76 (BP Location: Left arm, Patient Position: Sitting, BP Cuff Size: Adult)   Pulse 70   Temp 36.8 °C (98.2 °F)   Resp 12   Ht 1.689 m (5' 6.5\")   Wt 64.9 kg (143 lb)   SpO2 99%   Physical Exam    Procedure Note:  Bi-mannual pelvic examination: Normal external female genitalia.  No lesions. Uterus size shape and consistency wnl.  No adnexal masses.  No CMT.  Uterus position: anteverted  A speculum was placed into vagina and cervix  Normal appearing cervix, normal vaginal amteo, no purulent discharge  The cervix was cleaned with betadine.   I used sterile gloves.   A sterile tenaculum was not needed today.  The uterus was measured using a sterile sound and was measured to be 8cm.  The sound was then withdrawn. The IUD was  loaded in a sterile manner and advanced into position. The string was visualized and cut to approximately 3.5cm.   "   Patient tolerated the procedure well. No complications.    She was given a detailed aftercare instructions and return/ER precautions as well as detailed instructions about her IUD.       1 mo follow up for IUD string check with me- optional, may check strings at home as well.     LOT     [id number]  EXP 2027/January    Results  Laboratory Studies  Pregnancy test is negative.       Assessment & Plan  1. Intrauterine device insertion.  The pregnancy test yielded a negative result. She was advised to utilize condoms for a minimum of 2 weeks post-procedure to prevent pregnancy.  She was informed about the potential for intermittent cramping over the next few hours, similar to menstrual cramps, and the possibility of irregular bleeding for several weeks to months. She was advised to carry tampons or pads as a precaution. In the event of heavy bleeding, defined as soaking more than one tampon per hour, or severe cramping, she was instructed to seek immediate medical attention at the ER for an ultrasound. If irregular bleeding persists beyond 3 months, she was advised to inform us so that medication can be added to IUD to regulate bleeding.  She was reassured that it is safe to take 200 to 400 mg ibuprofen every 6 hours for pain management, similar to how she would manage menstrual cramps. She was also advised to alternate between Tylenol and ibuprofen every 3 hours if the pain becomes severe. The use of heating pads and light physical activity such as walking was recommended. She was cautioned against lifting heavy weights for approximately one week post-procedure. She was informed that the IUD can remain in place for up to 5 years, after which it can be replaced or removed if she decides to conceive. She was educated about the IUD insertion process, including the use of a speculum, light, lubrication, and Betadine for cervical cleaning. She was informed that the risk of infection with an IUD is very low, but extra caution  is taken during the procedure. She was advised to contact us if she has any questions or concerns.  She tolerated IUD insertion very well.    Follow-up  The patient will follow up in 4 weeks.    PROCEDURE  IUD insertion was performed today.               Please note that this dictation was created using voice recognition software. I have made every reasonable attempt to correct obvious errors, but I expect that there are errors of grammar and possibly content that I did not discover before finalizing the note.

## (undated) DEVICE — CANISTER SUCTION 3000ML MECHANICAL FILTER AUTO SHUTOFF MEDI-VAC NONSTERILE LF DISP  (40EA/CA)

## (undated) DEVICE — BOVIE FOOT CONTROL SUCTION - 6IN 10FR (25EA/CA)

## (undated) DEVICE — MASK ANESTHESIA ADULT  - (100/CA)

## (undated) DEVICE — LACTATED RINGERS INJ. 500 ML - (24EA/CA)

## (undated) DEVICE — ELECTRODE DUAL RETURN W/ CORD - (50/PK)

## (undated) DEVICE — Device

## (undated) DEVICE — TUBE E-T HI-LO CUFF 6.5MM (10EA/BX)

## (undated) DEVICE — KIT ANESTHESIA W/CIRCUIT & 3/LT BAG W/FILTER (20EA/CA)

## (undated) DEVICE — SET LEADWIRE 5 LEAD BEDSIDE DISPOSABLE ECG (1SET OF 5/EA)

## (undated) DEVICE — WATER IRRIGATION STERILE 1000ML (12EA/CA)

## (undated) DEVICE — PACK ENT OR - (2EA/CA)

## (undated) DEVICE — CATHETER IV 20 GA X 1-1/4 ---SURG.& SDS ONLY--- (50EA/BX)

## (undated) DEVICE — SPONGE TONSIL MEDIUM XRAY STERILE 1 - (5/PK 20PK/CA)"

## (undated) DEVICE — BOVIE NEEDLE TIP INSULATD NON-SAFETY 2CM (50/PK)

## (undated) DEVICE — SUCTION INSTRUMENT YANKAUER BULBOUS TIP W/O VENT (50EA/CA)

## (undated) DEVICE — LACTATED RINGERS INJ 1000 ML - (14EA/CA 60CA/PF)

## (undated) DEVICE — GLOVE BIOGEL SZ 7.5 SURGICAL PF LTX - (50PR/BX 4BX/CA)

## (undated) DEVICE — TUBE CONNECTING SUCTION - CLEAR PLASTIC STERILE 72 IN (50EA/CA)

## (undated) DEVICE — SENSOR SPO2 NEO LNCS ADHESIVE (20/BX) SEE USER NOTES

## (undated) DEVICE — PENCIL ELECTSURG 10FT BTN SWH - (50/CA)

## (undated) DEVICE — TUBE CONNECT SUCTION CLEAR 120 X 1/4" (50EA/CA)"

## (undated) DEVICE — CATHETER FOLEY ROBINSON 10FR 16IN STRL (12EA/CA)

## (undated) DEVICE — PROTECTOR ULNA NERVE - (36PR/CA)

## (undated) DEVICE — GLOVE BIOGEL PI INDICATOR SZ 7.0 SURGICAL PF LF - (50/BX 4BX/CA)

## (undated) DEVICE — MASK ANESTHESIA CHILD INFLATABLE CUSHION BUBBLEGUM (50EA/CS)

## (undated) DEVICE — CANISTER SUCTION RIGID RED 1500CC (40EA/CA)

## (undated) DEVICE — KIT  I.V. START (100EA/CA)

## (undated) DEVICE — ANTI-FOG SOLUTION - 60BTL/CA

## (undated) DEVICE — SODIUM CHL IRRIGATION 0.9% 1000ML (12EA/CA)

## (undated) DEVICE — ELECTRODE 850 FOAM ADHESIVE - HYDROGEL RADIOTRNSPRNT (50/PK)